# Patient Record
Sex: FEMALE | Race: WHITE | NOT HISPANIC OR LATINO | Employment: PART TIME | URBAN - METROPOLITAN AREA
[De-identification: names, ages, dates, MRNs, and addresses within clinical notes are randomized per-mention and may not be internally consistent; named-entity substitution may affect disease eponyms.]

---

## 2017-02-10 ENCOUNTER — CLINICAL SUPPORT (OUTPATIENT)
Dept: OBSTETRICS AND GYNECOLOGY | Facility: CLINIC | Age: 32
End: 2017-02-10

## 2017-02-10 VITALS
HEIGHT: 71 IN | BODY MASS INDEX: 36.4 KG/M2 | WEIGHT: 260 LBS | DIASTOLIC BLOOD PRESSURE: 72 MMHG | SYSTOLIC BLOOD PRESSURE: 110 MMHG | HEART RATE: 70 BPM

## 2017-02-10 DIAGNOSIS — Z30.42 ON DEPO-PROVERA FOR CONTRACEPTION: Primary | ICD-10-CM

## 2017-02-10 PROCEDURE — 96372 THER/PROPH/DIAG INJ SC/IM: CPT | Performed by: OBSTETRICS & GYNECOLOGY

## 2017-02-10 RX ORDER — MEDROXYPROGESTERONE ACETATE 150 MG/ML
150 INJECTION, SUSPENSION INTRAMUSCULAR ONCE
Status: COMPLETED | OUTPATIENT
Start: 2017-02-10 | End: 2017-02-10

## 2017-02-10 RX ORDER — MEDROXYPROGESTERONE ACETATE 150 MG/ML
150 INJECTION, SUSPENSION INTRAMUSCULAR
COMMUNITY
End: 2017-02-10 | Stop reason: SDUPTHER

## 2017-02-10 RX ADMIN — MEDROXYPROGESTERONE ACETATE 150 MG: 150 INJECTION, SUSPENSION INTRAMUSCULAR at 11:41

## 2017-02-10 NOTE — PROGRESS NOTES
Subjective   Lorena Vitale is a 31 y.o. female Depo injection. Lot #[id number], ndc #[id number], exp 2/19, R delt. Pt did not have a reaction to injection.    History of Present Illness        Review of Systems    Objective   Physical Exam      Assessment/Plan

## 2017-05-05 ENCOUNTER — CLINICAL SUPPORT (OUTPATIENT)
Dept: OBSTETRICS AND GYNECOLOGY | Facility: CLINIC | Age: 32
End: 2017-05-05

## 2017-05-05 VITALS
HEIGHT: 71 IN | DIASTOLIC BLOOD PRESSURE: 67 MMHG | BODY MASS INDEX: 37.63 KG/M2 | HEART RATE: 52 BPM | SYSTOLIC BLOOD PRESSURE: 105 MMHG | WEIGHT: 268.8 LBS

## 2017-05-05 DIAGNOSIS — Z30.013 ENCOUNTER FOR INITIAL PRESCRIPTION OF INJECTABLE CONTRACEPTIVE: Primary | ICD-10-CM

## 2017-05-05 PROCEDURE — 96372 THER/PROPH/DIAG INJ SC/IM: CPT | Performed by: OBSTETRICS & GYNECOLOGY

## 2017-05-05 RX ORDER — MEDROXYPROGESTERONE ACETATE 150 MG/ML
150 INJECTION, SUSPENSION INTRAMUSCULAR ONCE
Status: COMPLETED | OUTPATIENT
Start: 2017-05-05 | End: 2017-05-05

## 2017-05-05 RX ADMIN — MEDROXYPROGESTERONE ACETATE 150 MG: 150 INJECTION, SUSPENSION INTRAMUSCULAR at 11:25

## 2017-08-04 ENCOUNTER — CLINICAL SUPPORT (OUTPATIENT)
Dept: OBSTETRICS AND GYNECOLOGY | Facility: CLINIC | Age: 32
End: 2017-08-04

## 2017-08-04 VITALS
BODY MASS INDEX: 38.42 KG/M2 | HEART RATE: 61 BPM | SYSTOLIC BLOOD PRESSURE: 114 MMHG | DIASTOLIC BLOOD PRESSURE: 80 MMHG | WEIGHT: 274.4 LBS | HEIGHT: 71 IN

## 2017-08-04 DIAGNOSIS — Z30.013 ENCOUNTER FOR INITIAL PRESCRIPTION OF INJECTABLE CONTRACEPTIVE: Primary | ICD-10-CM

## 2017-08-04 PROCEDURE — 96372 THER/PROPH/DIAG INJ SC/IM: CPT | Performed by: OBSTETRICS & GYNECOLOGY

## 2017-08-04 RX ORDER — MEDROXYPROGESTERONE ACETATE 150 MG/ML
150 INJECTION, SUSPENSION INTRAMUSCULAR ONCE
Status: COMPLETED | OUTPATIENT
Start: 2017-08-04 | End: 2017-08-04

## 2017-08-04 RX ADMIN — MEDROXYPROGESTERONE ACETATE 150 MG: 150 INJECTION, SUSPENSION INTRAMUSCULAR at 11:19

## 2017-08-04 NOTE — PROGRESS NOTES
"Chief Complaint   Patient presents with   • Follow-up     depo       History of Present Illness    Patient is a 32 y.o. female    The following portions of the patient's history were reviewed and updated as appropriate: allergies, current medications, past family history, past medical history, past social history, past surgical history and problem list.    Review of Systems    Vitals:    08/04/17 1116   BP: 114/80   Pulse: 61   Weight: 274 lb 6.4 oz (124 kg)   Height: 71\" (180.3 cm)       Objective   Physical Exam      Assessment/Plan   Lorena was seen today for follow-up.    Diagnoses and all orders for this visit:    Encounter for initial prescription of injectable contraceptive  -     MedroxyPROGESTERone Acetate (DEPO-PROVERA) injection 150 mg; Inject 1 mL into the shoulder, thigh, or buttocks 1 (One) Time.                 No Follow-up on file.    "

## 2017-10-27 ENCOUNTER — CLINICAL SUPPORT (OUTPATIENT)
Dept: OBSTETRICS AND GYNECOLOGY | Facility: CLINIC | Age: 32
End: 2017-10-27

## 2017-10-27 VITALS — BODY MASS INDEX: 39.06 KG/M2 | HEIGHT: 71 IN | WEIGHT: 279 LBS

## 2017-10-27 DIAGNOSIS — Z30.42 DEPO-PROVERA CONTRACEPTIVE STATUS: Primary | ICD-10-CM

## 2017-10-27 PROCEDURE — 96372 THER/PROPH/DIAG INJ SC/IM: CPT | Performed by: OBSTETRICS & GYNECOLOGY

## 2017-10-27 RX ORDER — SULFAMETHOXAZOLE AND TRIMETHOPRIM 400; 80 MG/1; MG/1
1 TABLET ORAL 2 TIMES DAILY
COMMUNITY
End: 2018-01-19

## 2017-10-27 RX ORDER — MEDROXYPROGESTERONE ACETATE 150 MG/ML
150 INJECTION, SUSPENSION INTRAMUSCULAR ONCE
Status: COMPLETED | OUTPATIENT
Start: 2017-10-27 | End: 2017-10-27

## 2017-10-27 RX ADMIN — MEDROXYPROGESTERONE ACETATE 150 MG: 150 INJECTION, SUSPENSION INTRAMUSCULAR at 11:51

## 2017-10-27 NOTE — PROGRESS NOTES
Subjective   Lorena Vitale is a 32 y.o. female.   Here for depo provera inj. Lot # [id number] exp 04/2021 ndc [address]-5. Pt did not have a reaction.  History of Present Illness        Review of Systems    Objective   Physical Exam    Assessment/Plan   There are no diagnoses linked to this encounter.

## 2018-01-19 ENCOUNTER — CLINICAL SUPPORT (OUTPATIENT)
Dept: OBSTETRICS AND GYNECOLOGY | Facility: CLINIC | Age: 33
End: 2018-01-19

## 2018-01-19 VITALS
DIASTOLIC BLOOD PRESSURE: 78 MMHG | HEIGHT: 71 IN | WEIGHT: 274 LBS | BODY MASS INDEX: 38.36 KG/M2 | HEART RATE: 79 BPM | SYSTOLIC BLOOD PRESSURE: 115 MMHG

## 2018-01-19 DIAGNOSIS — Z30.42 DEPO-PROVERA CONTRACEPTIVE STATUS: Primary | ICD-10-CM

## 2018-01-19 PROCEDURE — 96372 THER/PROPH/DIAG INJ SC/IM: CPT | Performed by: OBSTETRICS & GYNECOLOGY

## 2018-01-19 RX ORDER — MEDROXYPROGESTERONE ACETATE 150 MG/ML
INJECTION, SUSPENSION INTRAMUSCULAR
Refills: 2 | COMMUNITY
Start: 2017-10-26 | End: 2018-04-13 | Stop reason: SDUPTHER

## 2018-01-19 RX ORDER — MEDROXYPROGESTERONE ACETATE 150 MG/ML
150 INJECTION, SUSPENSION INTRAMUSCULAR ONCE
Status: COMPLETED | OUTPATIENT
Start: 2018-01-19 | End: 2018-01-19

## 2018-01-19 RX ADMIN — MEDROXYPROGESTERONE ACETATE 150 MG: 150 INJECTION, SUSPENSION INTRAMUSCULAR at 11:06

## 2018-01-19 NOTE — PROGRESS NOTES
Subjective   Lorena Vitale is a 32 y.o. female. Here for depo provera inj. Lot # [id number] exp.5/2021 ndc [address]-3. Pt did not have a reaction.    History of Present Illness        Review of Systems    Objective   Physical Exam    Assessment/Plan   There are no diagnoses linked to this encounter.

## 2018-04-13 ENCOUNTER — OFFICE VISIT (OUTPATIENT)
Dept: OBSTETRICS AND GYNECOLOGY | Facility: CLINIC | Age: 33
End: 2018-04-13

## 2018-04-13 VITALS
SYSTOLIC BLOOD PRESSURE: 112 MMHG | DIASTOLIC BLOOD PRESSURE: 77 MMHG | WEIGHT: 282 LBS | HEIGHT: 71 IN | HEART RATE: 51 BPM | BODY MASS INDEX: 39.48 KG/M2

## 2018-04-13 DIAGNOSIS — Z79.899 ENCOUNTER FOR LONG-TERM (CURRENT) USE OF MEDICATIONS: ICD-10-CM

## 2018-04-13 DIAGNOSIS — Z72.0 TOBACCO USE: ICD-10-CM

## 2018-04-13 DIAGNOSIS — Z30.42 DEPO-PROVERA CONTRACEPTIVE STATUS: Primary | ICD-10-CM

## 2018-04-13 DIAGNOSIS — Z01.419 PAP SMEAR, AS PART OF ROUTINE GYNECOLOGICAL EXAMINATION: ICD-10-CM

## 2018-04-13 DIAGNOSIS — Z01.419 ENCOUNTER FOR GYNECOLOGICAL EXAMINATION WITHOUT ABNORMAL FINDING: ICD-10-CM

## 2018-04-13 DIAGNOSIS — N92.6 IRREGULAR BLEEDING: ICD-10-CM

## 2018-04-13 PROCEDURE — 99395 PREV VISIT EST AGE 18-39: CPT | Performed by: OBSTETRICS & GYNECOLOGY

## 2018-04-13 PROCEDURE — 99406 BEHAV CHNG SMOKING 3-10 MIN: CPT | Performed by: OBSTETRICS & GYNECOLOGY

## 2018-04-13 PROCEDURE — 96372 THER/PROPH/DIAG INJ SC/IM: CPT | Performed by: OBSTETRICS & GYNECOLOGY

## 2018-04-13 RX ORDER — MEDROXYPROGESTERONE ACETATE 150 MG/ML
150 INJECTION, SUSPENSION INTRAMUSCULAR ONCE
Status: COMPLETED | OUTPATIENT
Start: 2018-04-13 | End: 2018-04-13

## 2018-04-13 RX ORDER — MEDROXYPROGESTERONE ACETATE 150 MG/ML
1 INJECTION, SUSPENSION INTRAMUSCULAR
Qty: 1 ML | Refills: 3 | Status: SHIPPED | OUTPATIENT
Start: 2018-04-13 | End: 2019-03-08 | Stop reason: SDUPTHER

## 2018-04-13 RX ORDER — MELOXICAM 7.5 MG/1
TABLET ORAL
COMMUNITY
Start: 2018-03-24 | End: 2018-07-06

## 2018-04-13 RX ADMIN — MEDROXYPROGESTERONE ACETATE 150 MG: 150 INJECTION, SUSPENSION INTRAMUSCULAR at 11:32

## 2018-04-13 NOTE — PROGRESS NOTES
Subjective   Lorena Vitale is a 32 y.o. female  2, Para 2 AB 0, Living 2.  Last annual 18m, last pap >3y,   Cc: Annual exam  History of Present Illness  Patient denies any gynecologic problems at this time.  Patient is on Depo-Provera for menstrual regulation with good results.  She's never had a DEXA  The following portions of the patient's history were reviewed and updated as appropriate: allergies, current medications, past family history, past medical history, past social history, past surgical history and problem list.    Review of Systems   Constitutional: Negative for activity change, fatigue, fever and unexpected weight change.   HENT: Negative for hearing loss, sore throat and voice change.    Respiratory: Negative for cough, chest tightness, shortness of breath and wheezing.    Cardiovascular: Negative for chest pain, palpitations and leg swelling.   Gastrointestinal: Negative for abdominal distention, abdominal pain, anal bleeding, blood in stool, constipation, diarrhea, nausea, rectal pain and vomiting.   Endocrine: Negative for cold intolerance and heat intolerance.   Genitourinary: Negative for difficulty urinating, dyspareunia, dysuria, flank pain, frequency, genital sores, menstrual problem, pelvic pain, urgency, vaginal bleeding, vaginal discharge and vaginal pain.   Musculoskeletal: Negative for arthralgias and back pain.   Skin: Negative for rash.   Allergic/Immunologic: Negative for environmental allergies and food allergies.   Neurological: Negative for dizziness, tremors, syncope, weakness, light-headedness, numbness and headaches.   Hematological: Negative for adenopathy. Does not bruise/bleed easily.   Psychiatric/Behavioral: Negative for agitation, behavioral problems, self-injury, sleep disturbance and suicidal ideas. The patient is not nervous/anxious and is not hyperactive.          History reviewed. No pertinent past medical history.  Menstrual History:  OB History       "Para Term  AB Living    2 2 2   2    SAB TAB Ectopic Molar Multiple Live Births                  Menarche age: 13  No LMP recorded. Patient has had an injection.  Patient is on Depo-Provera for menstrual regulation, has irregular periods when not on contraception       Past Surgical History:   Procedure Laterality Date   • LAPAROSCOPIC TUBAL LIGATION       OB History      Para Term  AB Living    2 2 2   2    SAB TAB Ectopic Molar Multiple Live Births                 Family History   Problem Relation Age of Onset   • Diabetes Mother      History   Smoking Status   • Current Every Day Smoker   • Types: Cigarettes   Smokeless Tobacco   • Never Used     History   Alcohol Use No     Health Maintenance   Topic Date Due   • PNEUMOCOCCAL VACCINE (19-64 MEDIUM RISK) (1 of 1 - PPSV23) 2005   • TDAP/TD VACCINES (2 - Td) 12/10/2016   • PAP SMEAR  2018   • INFLUENZA VACCINE  2018       Current Outpatient Prescriptions:   •  MedroxyPROGESTERone Acetate 150 MG/ML suspension prefilled syringe, Inject 1 mL as directed Every 3 (Three) Months., Disp: 1 mL, Rfl: 3  •  meloxicam (MOBIC) 7.5 MG tablet, , Disp: , Rfl:   No current facility-administered medications for this visit.   Sexual History:Active  STD: HPV       I advised the patient of the risks in continuing to use tobacco, and I provided this patient with smoking cessation educational materials.  I also discussed how to quit smoking and the patient has expressed the willingness to quit.      During this visit, I spent > 3-10 minutes counseling the patient regarding smoking cessation.       Objective   Vitals:    18 1128   BP: 112/77   Pulse: 51   Weight: 128 kg (282 lb)   Height: 180.3 cm (71\")     Physical Exam   Constitutional: She is oriented to person, place, and time. She appears well-developed and well-nourished.   Obese   HENT:   Head: Normocephalic.   Eyes: Pupils are equal, round, and reactive to light.   Neck: Normal range " of motion. No thyromegaly present.   Cardiovascular: Normal rate, regular rhythm, normal heart sounds and intact distal pulses.    Pulmonary/Chest: Effort normal and breath sounds normal. No respiratory distress. She exhibits no tenderness. Right breast exhibits no inverted nipple, no mass, no nipple discharge, no skin change and no tenderness. Left breast exhibits no inverted nipple, no mass, no nipple discharge, no skin change and no tenderness. Breasts are symmetrical.   Abdominal: Soft. Bowel sounds are normal. Hernia confirmed negative in the right inguinal area and confirmed negative in the left inguinal area.   Genitourinary: Vagina normal and uterus normal. No breast tenderness or discharge. Pelvic exam was performed with patient supine. There is no rash, tenderness, lesion or injury on the right labia. There is no rash, tenderness, lesion or injury on the left labia. Uterus is not enlarged and not tender. Cervix exhibits no motion tenderness, no discharge and no friability. Right adnexum displays no mass, no tenderness and no fullness. Left adnexum displays no mass, no tenderness and no fullness.   Genitourinary Comments: Suboptimal exam secondary to body habitus   Lymphadenopathy:     She has no cervical adenopathy.        Right: No inguinal adenopathy present.        Left: No inguinal adenopathy present.   Neurological: She is alert and oriented to person, place, and time. She has normal reflexes.   Skin: Skin is warm and dry.   Psychiatric: She has a normal mood and affect. Her behavior is normal. Judgment and thought content normal.         Assessment/Plan   Lorena was seen today for gynecologic exam.    Diagnoses and all orders for this visit:    Depo-Provera contraceptive status  -     MedroxyPROGESTERone Acetate (DEPO-PROVERA) injection 150 mg; Inject 1 mL into the shoulder, thigh, or buttocks 1 (One) Time.    Pap smear, as part of routine gynecological examination  -     IGP, Apt HPV,rfx 16 /  18,45 - ThinPrep Vial, Cervix    Encounter for long-term (current) use of medications    Tobacco use    Irregular bleeding    Encounter for gynecological examination without abnormal finding    Other orders  -     MedroxyPROGESTERone Acetate 150 MG/ML suspension prefilled syringe; Inject 1 mL as directed Every 3 (Three) Months.  -      DEXA SCAN    Counseled about breast self-examination, DEXA scan, use of long-term Depo-Provera, smoking, weight loss

## 2018-04-13 NOTE — PATIENT INSTRUCTIONS
IF YOU SMOKE OR USE TOBACCO PLEASE READ THE FOLLOWING:    Why is smoking bad for me?  Smoking increases the risk of heart disease, lung disease, vascular disease, stroke, and cancer.     If you smoke, STOP!    If you would like more information on quitting smoking, please visit the [employer] website: [email or web address]   This link will provide additional resources including the QUIT line and the Beat the Pack support groups.     For more information:    Quit Now [facility]  1-800-QUIT-NOW  [email or web address]

## 2018-04-13 NOTE — PROGRESS NOTES
Subjective   Lorena Vitale is a 32 y.o. female. Here for depo provera inj. Lot # [id number] exp 5/31/2021 ndc [address]-7. Pt did not have a reaction.    History of Present Illness        Review of Systems    Objective   Physical Exam    Assessment/Plan   There are no diagnoses linked to this encounter.

## 2018-04-18 LAB
CYTOLOGIST CVX/VAG CYTO: NORMAL
CYTOLOGY CVX/VAG DOC THIN PREP: NORMAL
DX ICD CODE: NORMAL
DX ICD CODE: NORMAL
HIV 1 & 2 AB SER-IMP: NORMAL
HPV I/H RISK 4 DNA CVX QL PROBE+SIG AMP: NEGATIVE
Lab: NORMAL
OTHER STN SPEC: NORMAL
PATH REPORT.FINAL DX SPEC: NORMAL
STAT OF ADQ CVX/VAG CYTO-IMP: NORMAL

## 2018-07-06 ENCOUNTER — CLINICAL SUPPORT (OUTPATIENT)
Dept: OBSTETRICS AND GYNECOLOGY | Facility: CLINIC | Age: 33
End: 2018-07-06

## 2018-07-06 VITALS
HEIGHT: 71 IN | SYSTOLIC BLOOD PRESSURE: 109 MMHG | WEIGHT: 278 LBS | BODY MASS INDEX: 38.92 KG/M2 | HEART RATE: 71 BPM | DIASTOLIC BLOOD PRESSURE: 75 MMHG

## 2018-07-06 DIAGNOSIS — Z30.42 DEPO-PROVERA CONTRACEPTIVE STATUS: Primary | ICD-10-CM

## 2018-07-06 PROCEDURE — 96372 THER/PROPH/DIAG INJ SC/IM: CPT | Performed by: OBSTETRICS & GYNECOLOGY

## 2018-07-06 RX ORDER — MEDROXYPROGESTERONE ACETATE 150 MG/ML
150 INJECTION, SUSPENSION INTRAMUSCULAR ONCE
Status: COMPLETED | OUTPATIENT
Start: 2018-07-06 | End: 2018-07-06

## 2018-07-06 RX ADMIN — MEDROXYPROGESTERONE ACETATE 150 MG: 150 INJECTION, SUSPENSION INTRAMUSCULAR at 11:07

## 2018-07-06 NOTE — PROGRESS NOTES
Subjective   Lorena Vitale is a 33 y.o. female. Pt here for depo provera inj. Lot # [id number] exp 5/31/21 ndc [address]-8. Pt did not have a reaction.    History of Present Illness        Review of Systems    Objective   Physical Exam    Assessment/Plan   There are no diagnoses linked to this encounter.

## 2018-09-21 ENCOUNTER — CLINICAL SUPPORT (OUTPATIENT)
Dept: OBSTETRICS AND GYNECOLOGY | Facility: CLINIC | Age: 33
End: 2018-09-21

## 2018-09-21 VITALS
SYSTOLIC BLOOD PRESSURE: 123 MMHG | HEART RATE: 84 BPM | HEIGHT: 71 IN | DIASTOLIC BLOOD PRESSURE: 75 MMHG | WEIGHT: 274 LBS | BODY MASS INDEX: 38.36 KG/M2

## 2018-09-21 DIAGNOSIS — Z30.42 DEPO-PROVERA CONTRACEPTIVE STATUS: Primary | ICD-10-CM

## 2018-09-21 PROCEDURE — 96372 THER/PROPH/DIAG INJ SC/IM: CPT | Performed by: OBSTETRICS & GYNECOLOGY

## 2018-09-21 RX ORDER — IBUPROFEN 600 MG/1
600 TABLET ORAL
COMMUNITY
End: 2018-12-14

## 2018-09-21 RX ORDER — MEDROXYPROGESTERONE ACETATE 150 MG/ML
150 INJECTION, SUSPENSION INTRAMUSCULAR ONCE
Status: COMPLETED | OUTPATIENT
Start: 2018-09-21 | End: 2018-09-21

## 2018-09-21 RX ADMIN — MEDROXYPROGESTERONE ACETATE 150 MG: 150 INJECTION, SUSPENSION INTRAMUSCULAR at 09:25

## 2018-09-21 NOTE — PROGRESS NOTES
Subjective   Lorena Vitale is a 33 y.o. female. Here for Depo provera inj. Lot # [id number] exp 5/31/2022 ndc [address]-6. Pt did not have a reaction.    History of Present Illness        Review of Systems    Objective   Physical Exam    Assessment/Plan   There are no diagnoses linked to this encounter.

## 2018-12-14 ENCOUNTER — CLINICAL SUPPORT (OUTPATIENT)
Dept: OBSTETRICS AND GYNECOLOGY | Facility: CLINIC | Age: 33
End: 2018-12-14

## 2018-12-14 VITALS
SYSTOLIC BLOOD PRESSURE: 110 MMHG | BODY MASS INDEX: 38.08 KG/M2 | HEART RATE: 52 BPM | DIASTOLIC BLOOD PRESSURE: 71 MMHG | WEIGHT: 272 LBS | HEIGHT: 71 IN

## 2018-12-14 DIAGNOSIS — Z30.42 DEPO-PROVERA CONTRACEPTIVE STATUS: Primary | ICD-10-CM

## 2018-12-14 PROCEDURE — 96372 THER/PROPH/DIAG INJ SC/IM: CPT | Performed by: OBSTETRICS & GYNECOLOGY

## 2018-12-14 RX ORDER — MEDROXYPROGESTERONE ACETATE 150 MG/ML
150 INJECTION, SUSPENSION INTRAMUSCULAR ONCE
Status: COMPLETED | OUTPATIENT
Start: 2018-12-14 | End: 2018-12-14

## 2018-12-14 RX ADMIN — MEDROXYPROGESTERONE ACETATE 150 MG: 150 INJECTION, SUSPENSION INTRAMUSCULAR at 10:57

## 2018-12-14 NOTE — PROGRESS NOTES
Subjective   Lorena Vitale is a 33 y.o. female. Pt is here for depo provera inj. Lot # [id number] exp 9/30/2022 ndc [address]-8. Pt did not have a reaction.    History of Present Illness        Review of Systems    Objective   Physical Exam    Assessment/Plan   There are no diagnoses linked to this encounter.

## 2019-03-08 ENCOUNTER — CLINICAL SUPPORT (OUTPATIENT)
Dept: OBSTETRICS AND GYNECOLOGY | Facility: CLINIC | Age: 34
End: 2019-03-08

## 2019-03-08 VITALS
DIASTOLIC BLOOD PRESSURE: 79 MMHG | HEART RATE: 72 BPM | HEIGHT: 71 IN | WEIGHT: 279 LBS | SYSTOLIC BLOOD PRESSURE: 111 MMHG | BODY MASS INDEX: 39.06 KG/M2

## 2019-03-08 DIAGNOSIS — Z30.013 ENCOUNTER FOR INITIAL PRESCRIPTION OF INJECTABLE CONTRACEPTIVE: Primary | ICD-10-CM

## 2019-03-08 RX ORDER — MEDROXYPROGESTERONE ACETATE 150 MG/ML
150 INJECTION, SUSPENSION INTRAMUSCULAR ONCE
Status: COMPLETED | OUTPATIENT
Start: 2019-03-08 | End: 2019-03-08

## 2019-03-08 RX ADMIN — MEDROXYPROGESTERONE ACETATE 150 MG: 150 INJECTION, SUSPENSION INTRAMUSCULAR at 11:00

## 2019-05-16 ENCOUNTER — TELEPHONE (OUTPATIENT)
Dept: OBSTETRICS AND GYNECOLOGY | Facility: CLINIC | Age: 34
End: 2019-05-16

## 2019-05-16 NOTE — TELEPHONE ENCOUNTER
Pt is scheduled with you on 5/28 for her annual/depo shot. She is asking if you can please send in a refill for her MedroxyPROGESTERone Acetate (DEPO-PROVERA) injection 150 mg. RX # in chart, call back # [phone]

## 2019-05-28 ENCOUNTER — OFFICE VISIT (OUTPATIENT)
Dept: OBSTETRICS AND GYNECOLOGY | Facility: CLINIC | Age: 34
End: 2019-05-28

## 2019-05-28 VITALS
WEIGHT: 280 LBS | DIASTOLIC BLOOD PRESSURE: 76 MMHG | HEART RATE: 49 BPM | SYSTOLIC BLOOD PRESSURE: 110 MMHG | BODY MASS INDEX: 39.2 KG/M2 | HEIGHT: 71 IN

## 2019-05-28 DIAGNOSIS — Z01.419 WELL WOMAN EXAM WITH ROUTINE GYNECOLOGICAL EXAM: Primary | ICD-10-CM

## 2019-05-28 PROCEDURE — 99395 PREV VISIT EST AGE 18-39: CPT | Performed by: OBSTETRICS & GYNECOLOGY

## 2019-05-28 PROCEDURE — 99406 BEHAV CHNG SMOKING 3-10 MIN: CPT | Performed by: OBSTETRICS & GYNECOLOGY

## 2019-05-28 PROCEDURE — 96372 THER/PROPH/DIAG INJ SC/IM: CPT | Performed by: OBSTETRICS & GYNECOLOGY

## 2019-05-28 RX ORDER — MEDROXYPROGESTERONE ACETATE 150 MG/ML
1 INJECTION, SUSPENSION INTRAMUSCULAR
Qty: 1 EACH | Refills: 4 | Status: SHIPPED | OUTPATIENT
Start: 2019-05-28 | End: 2020-07-23 | Stop reason: SDUPTHER

## 2019-05-28 RX ORDER — MEDROXYPROGESTERONE ACETATE 150 MG/ML
INJECTION, SUSPENSION INTRAMUSCULAR
Refills: 0 | COMMUNITY
Start: 2019-05-22 | End: 2019-05-28 | Stop reason: SDUPTHER

## 2019-05-28 RX ORDER — MEDROXYPROGESTERONE ACETATE 150 MG/ML
150 INJECTION, SUSPENSION INTRAMUSCULAR ONCE
Status: COMPLETED | OUTPATIENT
Start: 2019-05-28 | End: 2019-05-28

## 2019-05-28 RX ADMIN — MEDROXYPROGESTERONE ACETATE 150 MG: 150 INJECTION, SUSPENSION INTRAMUSCULAR at 16:53

## 2019-05-28 NOTE — PATIENT INSTRUCTIONS
TIMING OF HEALTH BENEFITS AFTER QUITTING SMOKING ([facility] 2004)   Time since quitting Benefits   20 minutes   Your heart rate drops.   12 hours   Carbon monoxide level in your blood drops to normal.   2 weeks to 3 months Your heart attack risk begins to drop.      Your lung function begins to improve.   1 to 9 months   Your coughing and shortness of breath decrease.   1 year    Your added risk of coronary heart disease is half that of a smoker’s.   5 to 15 years   Your stroke risk is reduced to that of a nonsmoker’s.   10 years   Your lung cancer death rate is about half that of a smoker’s. Your risk of cancers of the mouth, throat, esophagus, bladder, kidney,    and pancreas decreases.   15 years   Your risk of coronary heart disease is back to that of a nonsmoker’s.     Adapted from ACOG (www.acog.org)

## 2019-05-28 NOTE — PROGRESS NOTES
(Patient supplied)  Patient here for depo injection. Patient did not have a reaction to the injection or medication. Injection on the right deltoid.  LOT: [id number]  EXP: 2023 jul 31

## 2019-05-28 NOTE — PROGRESS NOTES
Chief Complaint   Patient presents with   • Annual Exam     last pap: 4.13.18 NILM --HPV        Lorena Vtiale is a 34 y.o.  who presents for an annual examination     Pap history:  Last pap: 2018: NILM, HPV negative  Prior abnormal paps: yes, 7 years ago - treated with colposcopy  STDs  Sexually active: yes  History of STDs: no  Contraception:  Depo, no menses    Screening for BRCA-   Is patient's family history significant for BRCA risk factors? no    Past Medical History:   Diagnosis Date   • Abnormal Pap smear of cervix     HPV   • HPV (human papilloma virus) infection      Past Surgical History:   Procedure Laterality Date   • LAPAROSCOPIC TUBAL LIGATION       OB History    Para Term  AB Living   2 2 2     2   SAB TAB Ectopic Molar Multiple Live Births                    # Outcome Date GA Lbr Jerad/2nd Weight Sex Delivery Anes PTL Lv   2 Term      Vag-Spont      1 Term      Vag-Spont            Social History     Tobacco Use   • Smoking status: Current Every Day Smoker     Packs/day: 1.50     Types: Cigarettes   • Smokeless tobacco: Never Used   Substance Use Topics   • Alcohol use: No   • Drug use: No     Family History   Problem Relation Age of Onset   • Diabetes Mother    • Breast cancer Neg Hx    • Ovarian cancer Neg Hx    • Uterine cancer Neg Hx    • Colon cancer Neg Hx    • Deep vein thrombosis Neg Hx    • Pulmonary embolism Neg Hx      Current Outpatient Medications on File Prior to Visit   Medication Sig Dispense Refill   • [DISCONTINUED] medroxyPROGESTERone Acetate 150 MG/ML suspension prefilled syringe INJECT 1 ML IM Q  3 MONTHS  0     No current facility-administered medications on file prior to visit.      No Known Allergies     Review of Systems   Constitutional: Negative for chills, fever and unexpected weight change.   HENT: Negative for congestion, nosebleeds and sore throat.    Eyes: Negative for visual disturbance.   Respiratory: Negative for cough, chest tightness and  "shortness of breath.    Cardiovascular: Negative for chest pain and palpitations.   Gastrointestinal: Negative for abdominal pain, constipation, diarrhea, nausea and vomiting.   Endocrine: Negative for polyuria.   Genitourinary: Negative for difficulty urinating, dyspareunia, dysuria, frequency, genital sores, hematuria, menstrual problem, pelvic pain, urgency, vaginal bleeding, vaginal discharge and vaginal pain.   Musculoskeletal: Negative for arthralgias and joint swelling.   Skin: Negative for color change and rash.   Neurological: Negative for dizziness, light-headedness and headaches.   Hematological: Does not bruise/bleed easily.   Psychiatric/Behavioral: Negative for dysphoric mood. The patient is not nervous/anxious.          OBJECTIVE:   Vitals:    05/28/19 1007   BP: 110/76   Pulse: (!) 49   Weight: 127 kg (280 lb)   Height: 180.3 cm (70.98\")      Physical Exam   Constitutional: She is oriented to person, place, and time. She appears well-developed and well-nourished. No distress.   HENT:   Head: Normocephalic and atraumatic.   Eyes: EOM are normal. No scleral icterus.   Neck: Normal range of motion. Neck supple. No thyromegaly present.   Cardiovascular: Normal rate and regular rhythm. Exam reveals no gallop and no friction rub.   No murmur heard.  Pulmonary/Chest: Effort normal and breath sounds normal. No respiratory distress. She has no wheezes. She has no rales. She exhibits no tenderness. Right breast exhibits no inverted nipple. Left breast exhibits no inverted nipple. Breasts are symmetrical. There is no breast swelling.   Abdominal: Soft. Bowel sounds are normal. She exhibits no distension. There is no tenderness. There is no guarding.   Genitourinary: Vagina normal and uterus normal. There is no rash, tenderness, lesion, injury or Bartholin's cyst on the right labia. There is no rash, tenderness, lesion, injury or Bartholin's cyst on the left labia. Uterus is not mobile.   Cervix is not parous. " Cervix does not exhibit motion tenderness, discharge, friability, lesion, polyp, nabothian cyst, eversion, pinkness or cyanosis. Right adnexum displays no mass, no tenderness and no fullness. Right adnexum is present.Left adnexum displays no mass, no tenderness and no fullness. Left adnexum is present.No vaginal discharge found.   Musculoskeletal: Normal range of motion. She exhibits no edema or deformity.   Neurological: She is alert and oriented to person, place, and time.   Skin: Skin is warm and dry. No rash noted. She is not diaphoretic. No erythema.   Psychiatric: She has a normal mood and affect. Her speech is normal and behavior is normal. Judgment and thought content normal. Cognition and memory are normal.       ASSESSMENT/PLAN:     Well woman counseling/screening:    Cervical cancer screening:    Remote (7y ago) h/o cervical dysplasia in past   The patient is due for a pap in 2023.    Screening guidelines discussed with patient  Breast cancer screening:    Clinical breast exam recommended for age 20-39 years every 1-3 years   Mammogram recommended starting age 40   Breast self awareness encouraged  STD Screening   Testing declined.    Contraception :   Desires to continue DMPA.  Counseled on risks/benefits/alternatives    Family history    does not demonstrate need for genetics referral   Healthy lifestyle counseling:   return for routine annual checkups    Smoking cessation  I advised patient to quit, and offered support.  She is considering Wellbutrin/Chantix with PCP  I spent 5 minutes counseling the patient on benefits of smoking cessation and risks of continuing the behavior. Further information placed in AVS      Return in about 1 year (around 5/28/2020) for Annual physical.

## 2019-08-20 ENCOUNTER — CLINICAL SUPPORT (OUTPATIENT)
Dept: OBSTETRICS AND GYNECOLOGY | Facility: CLINIC | Age: 34
End: 2019-08-20

## 2019-08-20 VITALS
BODY MASS INDEX: 39.06 KG/M2 | DIASTOLIC BLOOD PRESSURE: 75 MMHG | HEART RATE: 56 BPM | WEIGHT: 279 LBS | HEIGHT: 71 IN | SYSTOLIC BLOOD PRESSURE: 108 MMHG

## 2019-08-20 DIAGNOSIS — Z30.42 ENCOUNTER FOR MANAGEMENT AND INJECTION OF DEPO-PROVERA: Primary | ICD-10-CM

## 2019-08-20 PROCEDURE — 96372 THER/PROPH/DIAG INJ SC/IM: CPT | Performed by: OBSTETRICS & GYNECOLOGY

## 2019-08-20 RX ORDER — MEDROXYPROGESTERONE ACETATE 150 MG/ML
150 INJECTION, SUSPENSION INTRAMUSCULAR ONCE
Status: COMPLETED | OUTPATIENT
Start: 2019-08-20 | End: 2019-08-20

## 2019-08-20 RX ADMIN — MEDROXYPROGESTERONE ACETATE 150 MG: 150 INJECTION, SUSPENSION INTRAMUSCULAR at 11:57

## 2019-08-23 NOTE — PROGRESS NOTES
(Patient supplied)  Patient here for depo injection. Patient did not have a reaction to the injection or medication. Injection on the right deltoid.

## 2019-11-12 ENCOUNTER — CLINICAL SUPPORT (OUTPATIENT)
Dept: OBSTETRICS AND GYNECOLOGY | Facility: CLINIC | Age: 34
End: 2019-11-12

## 2019-11-12 VITALS
DIASTOLIC BLOOD PRESSURE: 93 MMHG | HEIGHT: 71 IN | BODY MASS INDEX: 38.5 KG/M2 | SYSTOLIC BLOOD PRESSURE: 144 MMHG | WEIGHT: 275 LBS | HEART RATE: 90 BPM

## 2019-11-12 DIAGNOSIS — Z30.42 ENCOUNTER FOR MANAGEMENT AND INJECTION OF DEPO-PROVERA: Primary | ICD-10-CM

## 2019-11-12 PROCEDURE — 96372 THER/PROPH/DIAG INJ SC/IM: CPT | Performed by: OBSTETRICS & GYNECOLOGY

## 2019-11-12 RX ORDER — MEDROXYPROGESTERONE ACETATE 150 MG/ML
150 INJECTION, SUSPENSION INTRAMUSCULAR ONCE
Status: COMPLETED | OUTPATIENT
Start: 2019-11-12 | End: 2019-11-12

## 2019-11-12 RX ADMIN — MEDROXYPROGESTERONE ACETATE 150 MG: 150 INJECTION, SUSPENSION INTRAMUSCULAR at 10:32

## 2020-02-05 ENCOUNTER — CLINICAL SUPPORT (OUTPATIENT)
Dept: OBSTETRICS AND GYNECOLOGY | Facility: CLINIC | Age: 35
End: 2020-02-05

## 2020-02-05 VITALS
HEIGHT: 71 IN | SYSTOLIC BLOOD PRESSURE: 147 MMHG | DIASTOLIC BLOOD PRESSURE: 99 MMHG | HEART RATE: 55 BPM | WEIGHT: 280 LBS | BODY MASS INDEX: 39.2 KG/M2

## 2020-02-05 DIAGNOSIS — Z30.42 ENCOUNTER FOR MANAGEMENT AND INJECTION OF DEPO-PROVERA: Primary | ICD-10-CM

## 2020-02-05 PROCEDURE — 96372 THER/PROPH/DIAG INJ SC/IM: CPT | Performed by: OBSTETRICS & GYNECOLOGY

## 2020-02-05 RX ORDER — MEDROXYPROGESTERONE ACETATE 150 MG/ML
150 INJECTION, SUSPENSION INTRAMUSCULAR ONCE
Status: COMPLETED | OUTPATIENT
Start: 2020-02-05 | End: 2020-02-05

## 2020-02-05 RX ADMIN — MEDROXYPROGESTERONE ACETATE 150 MG: 150 INJECTION, SUSPENSION INTRAMUSCULAR at 10:53

## 2020-02-05 NOTE — PROGRESS NOTES
(Patient supplied)  Patient here for depo injection. Patient did not have a reaction to the injection or medication. Injection on the right deltoid. Lot [id number] exp 2024 ashwini 31

## 2020-05-06 ENCOUNTER — CLINICAL SUPPORT (OUTPATIENT)
Dept: OBSTETRICS AND GYNECOLOGY | Facility: CLINIC | Age: 35
End: 2020-05-06

## 2020-05-06 VITALS
SYSTOLIC BLOOD PRESSURE: 110 MMHG | WEIGHT: 288 LBS | BODY MASS INDEX: 40.32 KG/M2 | HEIGHT: 71 IN | HEART RATE: 54 BPM | DIASTOLIC BLOOD PRESSURE: 72 MMHG

## 2020-05-06 DIAGNOSIS — Z30.42 ENCOUNTER FOR MANAGEMENT AND INJECTION OF DEPO-PROVERA: Primary | ICD-10-CM

## 2020-05-06 PROCEDURE — 96372 THER/PROPH/DIAG INJ SC/IM: CPT | Performed by: OBSTETRICS & GYNECOLOGY

## 2020-05-06 RX ORDER — CEPHALEXIN 500 MG/1
500 CAPSULE ORAL
COMMUNITY
Start: 2020-04-29 | End: 2020-05-06

## 2020-05-06 RX ORDER — MEDROXYPROGESTERONE ACETATE 150 MG/ML
150 INJECTION, SUSPENSION INTRAMUSCULAR ONCE
Status: COMPLETED | OUTPATIENT
Start: 2020-05-06 | End: 2020-05-06

## 2020-05-06 RX ADMIN — MEDROXYPROGESTERONE ACETATE 150 MG: 150 INJECTION, SUSPENSION INTRAMUSCULAR at 12:14

## 2020-05-06 NOTE — PROGRESS NOTES
(Patient supplied)  Patient here for depo injection. Patient did not have a reaction to the injection or medication. Injection on the right deltoid. [id number] 2024 may 31

## 2020-07-23 RX ORDER — MEDROXYPROGESTERONE ACETATE 150 MG/ML
1 INJECTION, SUSPENSION INTRAMUSCULAR
Qty: 1 ML | Refills: 0 | Status: SHIPPED | OUTPATIENT
Start: 2020-07-23 | End: 2020-10-20 | Stop reason: SDUPTHER

## 2020-07-31 ENCOUNTER — CLINICAL SUPPORT (OUTPATIENT)
Dept: OBSTETRICS AND GYNECOLOGY | Facility: CLINIC | Age: 35
End: 2020-07-31

## 2020-07-31 DIAGNOSIS — Z30.42 ENCOUNTER FOR MANAGEMENT AND INJECTION OF DEPO-PROVERA: Primary | ICD-10-CM

## 2020-07-31 PROCEDURE — 96372 THER/PROPH/DIAG INJ SC/IM: CPT | Performed by: OBSTETRICS & GYNECOLOGY

## 2020-07-31 RX ORDER — MEDROXYPROGESTERONE ACETATE 150 MG/ML
150 INJECTION, SUSPENSION INTRAMUSCULAR ONCE
Status: COMPLETED | OUTPATIENT
Start: 2020-07-31 | End: 2020-07-31

## 2020-07-31 RX ADMIN — MEDROXYPROGESTERONE ACETATE 150 MG: 150 INJECTION, SUSPENSION INTRAMUSCULAR at 11:24

## 2020-07-31 NOTE — PROGRESS NOTES
Depo Provera 150 mg  Pt supplied  Tolerated well, no reaction  IM Right Deltoid  NDC: [address]-5  LOT: [id number]  EXP: 10/31/2023

## 2020-10-20 ENCOUNTER — OFFICE VISIT (OUTPATIENT)
Dept: OBSTETRICS AND GYNECOLOGY | Facility: CLINIC | Age: 35
End: 2020-10-20

## 2020-10-20 VITALS
HEIGHT: 71 IN | BODY MASS INDEX: 40.6 KG/M2 | HEART RATE: 90 BPM | SYSTOLIC BLOOD PRESSURE: 144 MMHG | WEIGHT: 290 LBS | DIASTOLIC BLOOD PRESSURE: 89 MMHG

## 2020-10-20 DIAGNOSIS — Z01.419 WELL WOMAN EXAM WITH ROUTINE GYNECOLOGICAL EXAM: Primary | ICD-10-CM

## 2020-10-20 DIAGNOSIS — Z30.42 ENCOUNTER FOR MANAGEMENT AND INJECTION OF DEPO-PROVERA: ICD-10-CM

## 2020-10-20 PROCEDURE — 96372 THER/PROPH/DIAG INJ SC/IM: CPT | Performed by: OBSTETRICS & GYNECOLOGY

## 2020-10-20 PROCEDURE — 99395 PREV VISIT EST AGE 18-39: CPT | Performed by: OBSTETRICS & GYNECOLOGY

## 2020-10-20 PROCEDURE — 99407 BEHAV CHNG SMOKING > 10 MIN: CPT | Performed by: OBSTETRICS & GYNECOLOGY

## 2020-10-20 RX ORDER — MEDROXYPROGESTERONE ACETATE 150 MG/ML
150 INJECTION, SUSPENSION INTRAMUSCULAR
Qty: 1 ML | Refills: 4 | Status: SHIPPED | OUTPATIENT
Start: 2020-10-20 | End: 2021-09-24 | Stop reason: SDUPTHER

## 2020-10-20 RX ORDER — BUPROPION HYDROCHLORIDE 150 MG/1
TABLET, EXTENDED RELEASE ORAL
Qty: 60 TABLET | Refills: 2 | Status: SHIPPED | OUTPATIENT
Start: 2020-10-20 | End: 2022-03-15

## 2020-10-20 RX ORDER — MEDROXYPROGESTERONE ACETATE 150 MG/ML
1 INJECTION, SUSPENSION INTRAMUSCULAR
Qty: 1 ML | Refills: 0 | Status: SHIPPED | OUTPATIENT
Start: 2020-10-20 | End: 2021-09-24 | Stop reason: SDUPTHER

## 2020-10-20 RX ORDER — MEDROXYPROGESTERONE ACETATE 150 MG/ML
150 INJECTION, SUSPENSION INTRAMUSCULAR ONCE
Status: COMPLETED | OUTPATIENT
Start: 2020-10-20 | End: 2020-10-20

## 2020-10-20 RX ADMIN — MEDROXYPROGESTERONE ACETATE 150 MG: 150 INJECTION, SUSPENSION INTRAMUSCULAR at 12:45

## 2020-10-20 NOTE — PROGRESS NOTES
Chief Complaint   Patient presents with   • Annual Exam     pap:  NILM -HPV        Lorena Vitale is a 35 y.o.  who presents for an annual examination     Pap history:  Last pap: 2018: NILM, HPV negative  Prior abnormal paps: yes, 7 years ago - treated with colposcopy  STDs  Sexually active: yes  History of STDs: no  Contraception:  Depo, no menses    Screening for BRCA-   Is patient's family history significant for BRCA risk factors? no    Past Medical History:   Diagnosis Date   • Abnormal Pap smear of cervix     HPV   • HPV (human papilloma virus) infection      Past Surgical History:   Procedure Laterality Date   • LAPAROSCOPIC TUBAL LIGATION     • TUBAL ABDOMINAL LIGATION       OB History    Para Term  AB Living   2 2 2     2   SAB TAB Ectopic Molar Multiple Live Births                    # Outcome Date GA Lbr Jerad/2nd Weight Sex Delivery Anes PTL Lv   2 Term      Vag-Spont      1 Term      Vag-Spont         Social History     Tobacco Use   • Smoking status: Current Every Day Smoker     Packs/day: 1.50     Types: Cigarettes   • Smokeless tobacco: Never Used   Substance Use Topics   • Alcohol use: No   • Drug use: No     Family History   Problem Relation Age of Onset   • Diabetes Mother    • Breast cancer Neg Hx    • Ovarian cancer Neg Hx    • Uterine cancer Neg Hx    • Colon cancer Neg Hx    • Deep vein thrombosis Neg Hx    • Pulmonary embolism Neg Hx      Current Outpatient Medications on File Prior to Visit   Medication Sig Dispense Refill   • Cetirizine HCl (WAL-ZYR PO)      • [DISCONTINUED] medroxyPROGESTERone Acetate 150 MG/ML suspension prefilled syringe Inject 1 mL into the appropriate muscle as directed by prescriber Every 3 (Three) Months. 1 mL 0     No current facility-administered medications on file prior to visit.      No Known Allergies     Review of Systems   Constitutional: Negative for chills, fever and unexpected weight change.   HENT: Negative for congestion,  "nosebleeds and sore throat.    Eyes: Negative for visual disturbance.   Respiratory: Negative for cough, chest tightness and shortness of breath.    Cardiovascular: Negative for chest pain and palpitations.   Gastrointestinal: Negative for abdominal pain, constipation, diarrhea, nausea and vomiting.   Endocrine: Negative for polyuria.   Genitourinary: Negative for difficulty urinating, dyspareunia, dysuria, frequency, genital sores, hematuria, menstrual problem, pelvic pain, urgency, vaginal bleeding, vaginal discharge and vaginal pain.   Musculoskeletal: Negative for arthralgias and joint swelling.   Skin: Negative for color change and rash.   Neurological: Negative for dizziness, light-headedness and headaches.   Hematological: Does not bruise/bleed easily.   Psychiatric/Behavioral: Negative for dysphoric mood. The patient is not nervous/anxious.          OBJECTIVE:   Vitals:    10/20/20 1052   BP: 144/89   Pulse: 90   Weight: 132 kg (290 lb)   Height: 180.3 cm (70.98\")      Physical Exam   Constitutional: She is oriented to person, place, and time. She appears well-developed and well-nourished. No distress.   HENT:   Head: Normocephalic and atraumatic.   Eyes: No scleral icterus.   Neck: Normal range of motion. Neck supple. No thyromegaly present.   Cardiovascular: Normal rate and regular rhythm. Exam reveals no gallop and no friction rub.   No murmur heard.  Pulmonary/Chest: Effort normal and breath sounds normal. No respiratory distress. She has no wheezes. She has no rales. She exhibits no tenderness. Right breast exhibits no inverted nipple. Left breast exhibits no inverted nipple. No breast swelling, tenderness, discharge or bleeding. Breasts are symmetrical.   Abdominal: Soft. Bowel sounds are normal. She exhibits no distension. There is no abdominal tenderness. There is no guarding.   Genitourinary: Vagina normal and uterus normal. There is no rash, tenderness, lesion, injury or Bartholin's cyst on the " right labia. There is no rash, tenderness, lesion, injury or Bartholin's cyst on the left labia. Uterus is not mobile.   Cervix is not parous. Cervix does not exhibit motion tenderness, discharge, friability, lesion, polyp, nabothian cyst, eversion, pinkness or cyanosis. Right adnexum displays no mass, no tenderness and no fullness. Right adnexum is present.Left adnexum displays no mass, no tenderness and no fullness. Left adnexum is present.No vaginal discharge found.   Musculoskeletal: Normal range of motion. No deformity.   Neurological: She is alert and oriented to person, place, and time.   Skin: Skin is warm and dry. No rash noted. She is not diaphoretic. No erythema.   Psychiatric: Her speech is normal and behavior is normal. Judgment and thought content normal.       ASSESSMENT/PLAN:     Well woman counseling/screening:    Cervical cancer screening:    Remote (7y ago) h/o cervical dysplasia in past   The patient is due for a pap in 2023.    Screening guidelines discussed with patient  Breast cancer screening:    Clinical breast exam recommended for age 20-39 years every 1-3 years   Mammogram recommended starting age 40   Breast self awareness encouraged  STD Screening   Testing declined.    Contraception :   Desires to continue DMPA.  Counseled on risks/benefits/alternatives. Patient counseled on other options.   Family history    does not demonstrate need for genetics referral   Healthy lifestyle counseling:   return for routine annual checkups   Elevated BP - reports she smoked before she came in and she feels that may have contributed to increased BP.  She will follow up with PCP.     Smoking cessation  Patient reports current nicotine user   She was counseled on the benefits of smoking cessation including but not limited to cardiovascular benefits.  She is willing to quit.   Quit date: one week from start of medication  Desires to start Wellbutrin as medication for cessation  Resources and counseling  provided  Follow up 2-3 weeks  I spent 5 minutes counseling the patient on benefits of smoking cessation and risks of continuing the behavior.           Return in about 3 weeks (around 11/10/2020) for recheck of wellbutrin, 3 months for DMPA .

## 2020-11-17 ENCOUNTER — TELEPHONE (OUTPATIENT)
Dept: OBSTETRICS AND GYNECOLOGY | Facility: CLINIC | Age: 35
End: 2020-11-17

## 2021-01-22 ENCOUNTER — CLINICAL SUPPORT (OUTPATIENT)
Dept: OBSTETRICS AND GYNECOLOGY | Facility: CLINIC | Age: 36
End: 2021-01-22

## 2021-01-22 DIAGNOSIS — Z30.42 ENCOUNTER FOR MANAGEMENT AND INJECTION OF DEPO-PROVERA: Primary | ICD-10-CM

## 2021-01-22 PROCEDURE — 96372 THER/PROPH/DIAG INJ SC/IM: CPT | Performed by: OBSTETRICS & GYNECOLOGY

## 2021-01-22 RX ORDER — MEDROXYPROGESTERONE ACETATE 150 MG/ML
150 INJECTION, SUSPENSION INTRAMUSCULAR ONCE
Status: COMPLETED | OUTPATIENT
Start: 2021-01-22 | End: 2021-01-22

## 2021-01-22 RX ADMIN — MEDROXYPROGESTERONE ACETATE 150 MG: 150 INJECTION, SUSPENSION INTRAMUSCULAR at 10:41

## 2021-04-16 ENCOUNTER — CLINICAL SUPPORT (OUTPATIENT)
Dept: OBSTETRICS AND GYNECOLOGY | Facility: CLINIC | Age: 36
End: 2021-04-16

## 2021-04-16 VITALS
SYSTOLIC BLOOD PRESSURE: 141 MMHG | DIASTOLIC BLOOD PRESSURE: 92 MMHG | WEIGHT: 293 LBS | BODY MASS INDEX: 41.02 KG/M2 | HEART RATE: 78 BPM | HEIGHT: 71 IN

## 2021-04-16 DIAGNOSIS — Z30.42 ENCOUNTER FOR MANAGEMENT AND INJECTION OF DEPO-PROVERA: Primary | ICD-10-CM

## 2021-04-16 PROCEDURE — 96372 THER/PROPH/DIAG INJ SC/IM: CPT | Performed by: OBSTETRICS & GYNECOLOGY

## 2021-04-16 RX ORDER — MEDROXYPROGESTERONE ACETATE 150 MG/ML
150 INJECTION, SUSPENSION INTRAMUSCULAR ONCE
Status: COMPLETED | OUTPATIENT
Start: 2021-04-16 | End: 2021-04-16

## 2021-04-16 RX ADMIN — MEDROXYPROGESTERONE ACETATE 150 MG: 150 INJECTION, SUSPENSION INTRAMUSCULAR at 11:14

## 2021-04-16 NOTE — PROGRESS NOTES
(Patient supplied)  Patient here for depo injection. Patient did not have a reaction to the injection or medication. Injection on the right deltoid.  [id number], 2025 aug 31, [address]-0

## 2021-06-16 ENCOUNTER — TELEPHONE (OUTPATIENT)
Dept: OBSTETRICS AND GYNECOLOGY | Facility: CLINIC | Age: 36
End: 2021-06-16

## 2021-06-16 NOTE — TELEPHONE ENCOUNTER
Left vm to reschedule depo appt on 7/9. Dr. Adler is on call until the afternoon and hoping to reschedule appt in the afternoon.

## 2021-07-09 ENCOUNTER — CLINICAL SUPPORT (OUTPATIENT)
Dept: OBSTETRICS AND GYNECOLOGY | Facility: CLINIC | Age: 36
End: 2021-07-09

## 2021-07-09 VITALS
BODY MASS INDEX: 40.46 KG/M2 | DIASTOLIC BLOOD PRESSURE: 94 MMHG | HEIGHT: 71 IN | WEIGHT: 289 LBS | SYSTOLIC BLOOD PRESSURE: 120 MMHG

## 2021-07-09 DIAGNOSIS — Z30.42 ENCOUNTER FOR MANAGEMENT AND INJECTION OF DEPO-PROVERA: Primary | ICD-10-CM

## 2021-07-09 PROCEDURE — 96372 THER/PROPH/DIAG INJ SC/IM: CPT | Performed by: OBSTETRICS & GYNECOLOGY

## 2021-07-09 RX ORDER — MEDROXYPROGESTERONE ACETATE 150 MG/ML
150 INJECTION, SUSPENSION INTRAMUSCULAR ONCE
Status: COMPLETED | OUTPATIENT
Start: 2021-07-09 | End: 2021-07-09

## 2021-07-09 RX ADMIN — MEDROXYPROGESTERONE ACETATE 150 MG: 150 INJECTION, SUSPENSION INTRAMUSCULAR at 14:30

## 2021-07-09 NOTE — PROGRESS NOTES
Patient tolerated depo provera  150 mg right deltoid without a reaction  ndc [id number]  Lot [id number]  Exp 09/30/2024

## 2021-09-24 ENCOUNTER — CLINICAL SUPPORT (OUTPATIENT)
Dept: OBSTETRICS AND GYNECOLOGY | Facility: CLINIC | Age: 36
End: 2021-09-24

## 2021-09-24 DIAGNOSIS — Z30.42 ENCOUNTER FOR MANAGEMENT AND INJECTION OF DEPO-PROVERA: Primary | ICD-10-CM

## 2021-09-24 PROCEDURE — 96372 THER/PROPH/DIAG INJ SC/IM: CPT | Performed by: OBSTETRICS & GYNECOLOGY

## 2021-09-24 RX ORDER — MEDROXYPROGESTERONE ACETATE 150 MG/ML
150 INJECTION, SUSPENSION INTRAMUSCULAR ONCE
Status: COMPLETED | OUTPATIENT
Start: 2021-09-24 | End: 2021-09-24

## 2021-09-24 RX ADMIN — MEDROXYPROGESTERONE ACETATE 150 MG: 150 INJECTION, SUSPENSION INTRAMUSCULAR at 10:25

## 2021-09-24 NOTE — PROGRESS NOTES
Pt supplied depo provera 150 mg  IM right deltoid  Tolerated well with  No reaction  NDC: [address]-6  LOT: [id number]  EXP: 04/30/2025

## 2021-12-14 RX ORDER — MEDROXYPROGESTERONE ACETATE 150 MG/ML
150 INJECTION, SUSPENSION INTRAMUSCULAR
Qty: 1 ML | Refills: 0 | Status: SHIPPED | OUTPATIENT
Start: 2021-12-14 | End: 2021-12-17 | Stop reason: SDUPTHER

## 2021-12-17 ENCOUNTER — CLINICAL SUPPORT (OUTPATIENT)
Dept: OBSTETRICS AND GYNECOLOGY | Facility: CLINIC | Age: 36
End: 2021-12-17

## 2021-12-17 DIAGNOSIS — Z30.42 ENCOUNTER FOR MANAGEMENT AND INJECTION OF DEPO-PROVERA: Primary | ICD-10-CM

## 2021-12-17 PROCEDURE — 96372 THER/PROPH/DIAG INJ SC/IM: CPT | Performed by: OBSTETRICS & GYNECOLOGY

## 2021-12-17 RX ORDER — MEDROXYPROGESTERONE ACETATE 150 MG/ML
150 INJECTION, SUSPENSION INTRAMUSCULAR ONCE
Status: COMPLETED | OUTPATIENT
Start: 2021-12-17 | End: 2021-12-17

## 2021-12-17 RX ADMIN — MEDROXYPROGESTERONE ACETATE 150 MG: 150 INJECTION, SUSPENSION INTRAMUSCULAR at 10:44

## 2021-12-17 NOTE — PROGRESS NOTES
Pt supplied depo provera 150 mg  IM right deltoid  Tolerated well, no reaction  NDC [address]-6  LOT: [id number]  EXP: 02/28/2025

## 2022-03-14 RX ORDER — MEDROXYPROGESTERONE ACETATE 150 MG/ML
INJECTION, SUSPENSION INTRAMUSCULAR
Qty: 1 ML | Refills: 0 | Status: SHIPPED | OUTPATIENT
Start: 2022-03-14 | End: 2022-03-15 | Stop reason: SDUPTHER

## 2022-03-14 NOTE — TELEPHONE ENCOUNTER
Pt called to request refill on depo.  At her last visit we rescheduled her annual but gave depo anyway.    Please advise if ok to refill depo, or wait until after annual exam.  Scheduled for annual & depo on 3/15/2022.      Pt # [phone]

## 2022-03-15 ENCOUNTER — OFFICE VISIT (OUTPATIENT)
Dept: OBSTETRICS AND GYNECOLOGY | Facility: CLINIC | Age: 37
End: 2022-03-15

## 2022-03-15 VITALS
HEIGHT: 71 IN | WEIGHT: 293 LBS | HEART RATE: 58 BPM | DIASTOLIC BLOOD PRESSURE: 82 MMHG | BODY MASS INDEX: 41.02 KG/M2 | SYSTOLIC BLOOD PRESSURE: 125 MMHG

## 2022-03-15 DIAGNOSIS — Z01.419 WELL WOMAN EXAM WITH ROUTINE GYNECOLOGICAL EXAM: Primary | ICD-10-CM

## 2022-03-15 DIAGNOSIS — Z30.42 ENCOUNTER FOR MANAGEMENT AND INJECTION OF DEPO-PROVERA: ICD-10-CM

## 2022-03-15 PROBLEM — E66.812 OBESITY, CLASS II, BMI 35-39.9: Status: ACTIVE | Noted: 2021-07-29

## 2022-03-15 PROBLEM — Z98.890 S/P LATERAL MENISCECTOMY OF RIGHT KNEE: Status: ACTIVE | Noted: 2021-10-11

## 2022-03-15 PROBLEM — M17.31 POST-TRAUMATIC OSTEOARTHRITIS OF RIGHT KNEE: Status: ACTIVE | Noted: 2022-02-15

## 2022-03-15 PROBLEM — S83.281A ACUTE LATERAL MENISCAL TEAR, RIGHT, INITIAL ENCOUNTER: Status: ACTIVE | Noted: 2022-03-15

## 2022-03-15 PROBLEM — S83.271A COMPLEX TEAR OF LAT MENSC, CURRENT INJURY, RIGHT KNEE, INIT: Status: ACTIVE | Noted: 2021-07-29

## 2022-03-15 PROBLEM — E66.9 OBESITY, CLASS II, BMI 35-39.9: Status: ACTIVE | Noted: 2021-07-29

## 2022-03-15 PROBLEM — M25.561 RIGHT KNEE PAIN: Status: ACTIVE | Noted: 2021-08-30

## 2022-03-15 PROCEDURE — 99395 PREV VISIT EST AGE 18-39: CPT | Performed by: OBSTETRICS & GYNECOLOGY

## 2022-03-15 PROCEDURE — 2014F MENTAL STATUS ASSESS: CPT | Performed by: OBSTETRICS & GYNECOLOGY

## 2022-03-15 PROCEDURE — 96372 THER/PROPH/DIAG INJ SC/IM: CPT | Performed by: OBSTETRICS & GYNECOLOGY

## 2022-03-15 RX ORDER — MEDROXYPROGESTERONE ACETATE 150 MG/ML
1 INJECTION, SUSPENSION INTRAMUSCULAR
Qty: 1 ML | Refills: 4 | Status: SHIPPED | OUTPATIENT
Start: 2022-03-15

## 2022-03-15 RX ORDER — MEDROXYPROGESTERONE ACETATE 150 MG/ML
150 INJECTION, SUSPENSION INTRAMUSCULAR ONCE
Status: COMPLETED | OUTPATIENT
Start: 2022-03-15 | End: 2022-03-15

## 2022-03-15 RX ADMIN — MEDROXYPROGESTERONE ACETATE 150 MG: 150 INJECTION, SUSPENSION INTRAMUSCULAR at 10:33

## 2022-03-15 NOTE — PROGRESS NOTES
Chief Complaint   Patient presents with   • Gynecologic Exam     Here for Annual exam and patient suppliedDepo Provera inj. [id number] exp 2025 ndc 14426-417-03. Pt did not have a reaction.        Lorena Vitale is a 36 y.o.  who presents for an annual examination   Reports tried wellbutrin once daily, woke up with craving to smoke at night and had a couple bad dreams so she stopped    Pap history:  Last pap: 2018: NILM, HPV negative  Prior abnormal paps: yes, 7 years ago - treated with colposcopy  STDs  Sexually active: yes  History of STDs: no  Contraception:  Depo, no menses    Screening for BRCA-   Is patient's family history significant for BRCA risk factors? no    Past Medical History:   Diagnosis Date   • Abnormal Pap smear of cervix     HPV   • HPV (human papilloma virus) infection      Past Surgical History:   Procedure Laterality Date   • LAPAROSCOPIC TUBAL LIGATION     • TUBAL ABDOMINAL LIGATION       OB History    Para Term  AB Living   2 2 2     2   SAB IAB Ectopic Molar Multiple Live Births                    # Outcome Date GA Lbr Jerad/2nd Weight Sex Delivery Anes PTL Lv   2 Term      Vag-Spont      1 Term      Vag-Spont         Social History     Tobacco Use   • Smoking status: Current Every Day Smoker     Packs/day: 1.50     Types: Cigarettes   • Smokeless tobacco: Never Used   Substance Use Topics   • Alcohol use: No   • Drug use: No     Family History   Problem Relation Age of Onset   • Diabetes Mother    • Breast cancer Neg Hx    • Ovarian cancer Neg Hx    • Uterine cancer Neg Hx    • Colon cancer Neg Hx    • Deep vein thrombosis Neg Hx    • Pulmonary embolism Neg Hx      Current Outpatient Medications on File Prior to Visit   Medication Sig Dispense Refill   • Cetirizine HCl (WAL-ZYR PO)      • [DISCONTINUED] buPROPion SR (Wellbutrin SR) 150 MG 12 hr tablet Take 1 tablet by mouth Daily for 3 days, THEN 1 tablet 2 (two) times a day for 90 days. 60 tablet 2   •  "[DISCONTINUED] medroxyPROGESTERone Acetate 150 MG/ML suspension prefilled syringe ADMINISTER 1 ML IN THE MUSCLE EVERY 3 MONTHS AS DIRECTED BY PRESCRIBER 1 mL 0     No current facility-administered medications on file prior to visit.     No Known Allergies     Review of Systems   Constitutional: Negative for chills, fever and unexpected weight change.   HENT: Negative for congestion, nosebleeds and sore throat.    Eyes: Negative for visual disturbance.   Respiratory: Negative for cough, chest tightness and shortness of breath.    Cardiovascular: Negative for chest pain and palpitations.   Gastrointestinal: Negative for abdominal pain, constipation, diarrhea, nausea and vomiting.   Endocrine: Negative for polyuria.   Genitourinary: Negative for difficulty urinating, dyspareunia, dysuria, frequency, genital sores, hematuria, menstrual problem, pelvic pain, urgency, vaginal bleeding, vaginal discharge and vaginal pain.   Musculoskeletal: Negative for arthralgias and joint swelling.   Skin: Negative for color change and rash.   Neurological: Negative for dizziness, light-headedness and headaches.   Hematological: Does not bruise/bleed easily.   Psychiatric/Behavioral: Negative for dysphoric mood. The patient is not nervous/anxious.          OBJECTIVE:   Vitals:    03/15/22 1029   BP: 125/82   Pulse: 58   Weight: 133 kg (293 lb)   Height: 180.3 cm (71\")      Physical Exam   Constitutional: She is oriented to person, place, and time. She appears well-developed and well-nourished. No distress.   HENT:   Head: Normocephalic and atraumatic.   Eyes: No scleral icterus.   Neck: No thyromegaly present.   Cardiovascular: Normal rate and regular rhythm. Exam reveals no gallop and no friction rub.   No murmur heard.  Pulmonary/Chest: Effort normal and breath sounds normal. No respiratory distress. She has no wheezes. She has no rales. She exhibits no tenderness. Right breast exhibits no inverted nipple. Left breast exhibits no " inverted nipple. No breast swelling, tenderness, discharge or bleeding. Breasts are symmetrical.   Abdominal: Soft. Bowel sounds are normal. She exhibits no distension. There is no abdominal tenderness. There is no guarding.   Genitourinary: Vagina normal and uterus normal. There is no rash, tenderness, lesion, injury or Bartholin's cyst on the right labia. There is no rash, tenderness, lesion, injury or Bartholin's cyst on the left labia. Uterus is not mobile.   Cervix is not parous. Cervix does not exhibit motion tenderness, discharge, friability, lesion, polyp, nabothian cyst, eversion, pinkness or cyanosis. Right adnexum displays no mass, no tenderness and no fullness. Right adnexum is present.Left adnexum displays no mass, no tenderness and no fullness. Left adnexum is present.No vaginal discharge found.   Musculoskeletal: Normal range of motion. No deformity.   Neurological: She is alert and oriented to person, place, and time.   Skin: Skin is warm and dry. No rash noted. She is not diaphoretic. No erythema.   Psychiatric: Her speech is normal and behavior is normal. Judgment and thought content normal.       ASSESSMENT/PLAN:     Well woman counseling/screening:    Cervical cancer screening:    Remote (7y ago) h/o cervical dysplasia in past   The patient is due for a pap in 2023.    Screening guidelines discussed with patient  Breast cancer screening:    Clinical breast exam recommended for age 20-39 years every 1-3 years   Mammogram recommended starting age 40   Breast self awareness encouraged  STD Screening   Testing declined.    Contraception :   Desires to continue DMPA.  Counseled on risks/benefits/alternatives; reviewed recommendation to discontinue DMPA and switch to alternative progestin only method prior to reaching menopause to recover bone density.   Family history    does not demonstrate need for genetics referral   Healthy lifestyle counseling:   return for routine annual  checkups                 Return in about 12 weeks (around 6/7/2022). for DMPA, 1 year for annual

## 2022-08-11 ENCOUNTER — TELEPHONE (OUTPATIENT)
Dept: OBSTETRICS AND GYNECOLOGY | Facility: CLINIC | Age: 37
End: 2022-08-11

## 2022-08-11 NOTE — TELEPHONE ENCOUNTER
Provider: GLORIA SHAHID   Caller: KAHLIL NICHOLSON  Relationship to Patient:  SELF  [place] [id number]  [facility]  Phone Number: [phone]  Reason for Call: PT IS EXPERIENCING MENSTRUAL BLEEDING, PT IS ON DEPO BUT HAS NOT HAD A INJECTION SINCE MARCH, 10 YEARS AND PT HAS NOT HAD A PERIOD  When was the patient last seen: 3/2022  When did it start: TODAY  Characteristics of symptom/severity: HEAVY BLEEDING    NOTE: PT IS CRAMPING,BACK PAIN, INFORMED PT TO GO TO EMERGENCY ROOM IF IT CONTINUES.

## 2022-08-12 ENCOUNTER — CLINICAL SUPPORT (OUTPATIENT)
Dept: OBSTETRICS AND GYNECOLOGY | Facility: CLINIC | Age: 37
End: 2022-08-12

## 2022-08-12 DIAGNOSIS — Z30.42 ENCOUNTER FOR MANAGEMENT AND INJECTION OF DEPO-PROVERA: Primary | ICD-10-CM

## 2022-08-12 PROCEDURE — 96372 THER/PROPH/DIAG INJ SC/IM: CPT | Performed by: OBSTETRICS & GYNECOLOGY

## 2022-08-12 RX ORDER — MEDROXYPROGESTERONE ACETATE 150 MG/ML
150 INJECTION, SUSPENSION INTRAMUSCULAR ONCE
Status: COMPLETED | OUTPATIENT
Start: 2022-08-12 | End: 2022-08-12

## 2022-08-12 RX ADMIN — MEDROXYPROGESTERONE ACETATE 150 MG: 150 INJECTION, SUSPENSION INTRAMUSCULAR at 09:25

## 2022-11-11 ENCOUNTER — CLINICAL SUPPORT (OUTPATIENT)
Dept: OBSTETRICS AND GYNECOLOGY | Facility: CLINIC | Age: 37
End: 2022-11-11

## 2022-11-11 VITALS — BODY MASS INDEX: 41.84 KG/M2 | WEIGHT: 293 LBS

## 2022-11-11 DIAGNOSIS — Z30.42 ENCOUNTER FOR MANAGEMENT AND INJECTION OF DEPO-PROVERA: Primary | ICD-10-CM

## 2022-11-11 PROCEDURE — 96372 THER/PROPH/DIAG INJ SC/IM: CPT | Performed by: OBSTETRICS & GYNECOLOGY

## 2022-11-11 RX ORDER — MEDROXYPROGESTERONE ACETATE 150 MG/ML
150 INJECTION, SUSPENSION INTRAMUSCULAR ONCE
Status: COMPLETED | OUTPATIENT
Start: 2022-11-11 | End: 2022-11-11

## 2022-11-11 RX ADMIN — MEDROXYPROGESTERONE ACETATE 150 MG: 150 INJECTION, SUSPENSION INTRAMUSCULAR at 11:22

## 2022-11-11 NOTE — PROGRESS NOTES
Pt here for pt supplied depo injection, tolerated without a reaction. Lt Deltoid.  NDC:09952-079-02  LOT:[id number]  EXP:11/30/2026

## 2023-02-03 ENCOUNTER — CLINICAL SUPPORT (OUTPATIENT)
Dept: OBSTETRICS AND GYNECOLOGY | Facility: CLINIC | Age: 38
End: 2023-02-03
Payer: COMMERCIAL

## 2023-02-03 VITALS — BODY MASS INDEX: 41.14 KG/M2 | WEIGHT: 293 LBS

## 2023-02-03 DIAGNOSIS — Z30.42 ENCOUNTER FOR MANAGEMENT AND INJECTION OF DEPO-PROVERA: Primary | ICD-10-CM

## 2023-02-03 PROCEDURE — 96372 THER/PROPH/DIAG INJ SC/IM: CPT | Performed by: NURSE PRACTITIONER

## 2023-02-03 RX ORDER — MEDROXYPROGESTERONE ACETATE 150 MG/ML
150 INJECTION, SUSPENSION INTRAMUSCULAR ONCE
Status: COMPLETED | OUTPATIENT
Start: 2023-02-03 | End: 2023-02-03

## 2023-02-03 RX ADMIN — MEDROXYPROGESTERONE ACETATE 150 MG: 150 INJECTION, SUSPENSION INTRAMUSCULAR at 14:43

## 2023-02-03 NOTE — PROGRESS NOTES
Pt here for patient supplied depo injection. Rt. Deltoid. Pt tolerated w/o a reaction.   NDC: 67832-993-80 Lot: [id number] Exp: 4/30/27

## 2023-04-27 ENCOUNTER — TELEPHONE (OUTPATIENT)
Dept: OBSTETRICS AND GYNECOLOGY | Facility: CLINIC | Age: 38
End: 2023-04-27

## 2023-04-27 RX ORDER — MEDROXYPROGESTERONE ACETATE 150 MG/ML
INJECTION, SUSPENSION INTRAMUSCULAR
Qty: 1 ML | Refills: 4 | Status: SHIPPED | OUTPATIENT
Start: 2023-04-27

## 2023-04-27 NOTE — TELEPHONE ENCOUNTER
Med refill. Vitor pt. AE 3/15/22. Needs refill for injection scheduled tomorrow, 4/28/23 at 10:20. [facility] pharmacy. Thank you.

## 2023-04-27 NOTE — TELEPHONE ENCOUNTER
PT STATING SHE WILL NEED HER DEPO REFILLED FOR HER APPT TOMORROW FOR HER INJECTION. PLEASE ADVISE PT.

## 2023-04-28 ENCOUNTER — CLINICAL SUPPORT (OUTPATIENT)
Dept: OBSTETRICS AND GYNECOLOGY | Facility: CLINIC | Age: 38
End: 2023-04-28
Payer: COMMERCIAL

## 2023-04-28 VITALS — BODY MASS INDEX: 41.65 KG/M2 | WEIGHT: 293 LBS

## 2023-04-28 DIAGNOSIS — Z30.42 ENCOUNTER FOR MANAGEMENT AND INJECTION OF DEPO-PROVERA: Primary | ICD-10-CM

## 2023-04-28 RX ORDER — MEDROXYPROGESTERONE ACETATE 150 MG/ML
150 INJECTION, SUSPENSION INTRAMUSCULAR ONCE
Status: COMPLETED | OUTPATIENT
Start: 2023-04-28 | End: 2023-04-28

## 2023-04-28 RX ADMIN — MEDROXYPROGESTERONE ACETATE 150 MG: 150 INJECTION, SUSPENSION INTRAMUSCULAR at 10:30

## 2023-04-28 NOTE — PROGRESS NOTES
Pt presents for pt supplied Depo injection. Given in Rt Deltoid. Pt tolerated injection without a reaction.   NDC: 36518-966-55 Lot: [id number] Exp: 9/30/26.

## 2023-07-26 ENCOUNTER — CLINICAL SUPPORT (OUTPATIENT)
Dept: OBSTETRICS AND GYNECOLOGY | Facility: CLINIC | Age: 38
End: 2023-07-26
Payer: COMMERCIAL

## 2023-07-26 VITALS
HEIGHT: 71 IN | WEIGHT: 293 LBS | HEART RATE: 79 BPM | BODY MASS INDEX: 41.02 KG/M2 | DIASTOLIC BLOOD PRESSURE: 103 MMHG | SYSTOLIC BLOOD PRESSURE: 150 MMHG

## 2023-07-26 DIAGNOSIS — Z30.42 ENCOUNTER FOR MANAGEMENT AND INJECTION OF DEPO-PROVERA: Primary | ICD-10-CM

## 2023-07-26 RX ORDER — MEDROXYPROGESTERONE ACETATE 150 MG/ML
150 INJECTION, SUSPENSION INTRAMUSCULAR ONCE
Status: COMPLETED | OUTPATIENT
Start: 2023-07-26 | End: 2023-07-26

## 2023-07-26 RX ADMIN — MEDROXYPROGESTERONE ACETATE 150 MG: 150 INJECTION, SUSPENSION INTRAMUSCULAR at 10:22

## 2023-10-31 ENCOUNTER — CLINICAL SUPPORT (OUTPATIENT)
Dept: OBSTETRICS AND GYNECOLOGY | Facility: CLINIC | Age: 38
End: 2023-10-31
Payer: COMMERCIAL

## 2023-10-31 VITALS
HEIGHT: 71 IN | SYSTOLIC BLOOD PRESSURE: 150 MMHG | DIASTOLIC BLOOD PRESSURE: 94 MMHG | WEIGHT: 293 LBS | BODY MASS INDEX: 41.02 KG/M2

## 2023-10-31 DIAGNOSIS — Z30.42 ENCOUNTER FOR DEPO-PROVERA CONTRACEPTION: Primary | ICD-10-CM

## 2023-10-31 RX ORDER — MEDROXYPROGESTERONE ACETATE 150 MG/ML
150 INJECTION, SUSPENSION INTRAMUSCULAR ONCE
Status: COMPLETED | OUTPATIENT
Start: 2023-10-31 | End: 2023-10-31

## 2023-10-31 RX ADMIN — MEDROXYPROGESTERONE ACETATE 150 MG: 150 INJECTION, SUSPENSION INTRAMUSCULAR at 09:16

## 2024-01-31 ENCOUNTER — CLINICAL SUPPORT (OUTPATIENT)
Dept: OBSTETRICS AND GYNECOLOGY | Facility: CLINIC | Age: 39
End: 2024-01-31
Payer: COMMERCIAL

## 2024-01-31 VITALS — BODY MASS INDEX: 43.09 KG/M2 | HEIGHT: 71 IN

## 2024-01-31 DIAGNOSIS — Z30.42 ENCOUNTER FOR MANAGEMENT AND INJECTION OF DEPO-PROVERA: Primary | ICD-10-CM

## 2024-01-31 RX ORDER — MEDROXYPROGESTERONE ACETATE 150 MG/ML
150 INJECTION, SUSPENSION INTRAMUSCULAR ONCE
Status: COMPLETED | OUTPATIENT
Start: 2024-01-31 | End: 2024-01-31

## 2024-01-31 RX ADMIN — MEDROXYPROGESTERONE ACETATE 150 MG: 150 INJECTION, SUSPENSION INTRAMUSCULAR at 08:54

## 2024-01-31 NOTE — PROGRESS NOTES
Pt here today for pt supplied depo injection, tolerated without  reaction. Rt Deltoid. Ok per Tiffany to give injection.   NDC:33895-195-69  LOT:[id number]  EXP:9/30/2027

## 2024-03-12 ENCOUNTER — OFFICE VISIT (OUTPATIENT)
Dept: OBSTETRICS AND GYNECOLOGY | Facility: CLINIC | Age: 39
End: 2024-03-12
Payer: COMMERCIAL

## 2024-03-12 VITALS
WEIGHT: 293 LBS | SYSTOLIC BLOOD PRESSURE: 151 MMHG | HEIGHT: 71 IN | HEART RATE: 54 BPM | DIASTOLIC BLOOD PRESSURE: 91 MMHG | BODY MASS INDEX: 41.02 KG/M2

## 2024-03-12 DIAGNOSIS — Z01.419 WELL WOMAN EXAM WITH ROUTINE GYNECOLOGICAL EXAM: Primary | ICD-10-CM

## 2024-03-12 DIAGNOSIS — R63.5 WEIGHT GAIN: ICD-10-CM

## 2024-03-12 PROCEDURE — 99395 PREV VISIT EST AGE 18-39: CPT | Performed by: OBSTETRICS & GYNECOLOGY

## 2024-03-12 PROCEDURE — 1160F RVW MEDS BY RX/DR IN RCRD: CPT | Performed by: OBSTETRICS & GYNECOLOGY

## 2024-03-12 PROCEDURE — 1159F MED LIST DOCD IN RCRD: CPT | Performed by: OBSTETRICS & GYNECOLOGY

## 2024-03-12 PROCEDURE — 2014F MENTAL STATUS ASSESS: CPT | Performed by: OBSTETRICS & GYNECOLOGY

## 2024-03-12 RX ORDER — MEDROXYPROGESTERONE ACETATE 150 MG/ML
1 INJECTION, SUSPENSION INTRAMUSCULAR
Qty: 1 ML | Refills: 4 | Status: SHIPPED | OUTPATIENT
Start: 2024-03-12

## 2024-03-12 NOTE — PROGRESS NOTES
Chief Complaint   Patient presents with    Annual Exam     Last ae: 3/15/2022  Last pap: 2018 NIL HPV-        Lorena Vitale is a 38 y.o.  who presents for an annual examination   She reports difficulty losing weight, swelling in her feet and legs that she saw her PCP for previously  Reports a family h/o thyroid disease in her mom    Pap history:  Last pap: 2018: NILM, HPV negative  Prior abnormal paps: yes, 7 years ago - treated with colposcopy  STDs  Sexually active: yes  History of STDs: no  Contraception:  DMPA, no menses    Screening for BRCA-   Is patient's family history significant for BRCA risk factors? no    Past Medical History:   Diagnosis Date    Abnormal Pap smear of cervix     HPV    HPV (human papilloma virus) infection      Past Surgical History:   Procedure Laterality Date    LAPAROSCOPIC TUBAL LIGATION      TUBAL ABDOMINAL LIGATION       OB History    Para Term  AB Living   2 2 2     2   SAB IAB Ectopic Molar Multiple Live Births                    # Outcome Date GA Lbr Jerad/2nd Weight Sex Type Anes PTL Lv   2 Term      Vag-Spont      1 Term      Vag-Spont         Social History     Tobacco Use    Smoking status: Every Day     Current packs/day: 1.50     Types: Cigarettes    Smokeless tobacco: Never   Vaping Use    Vaping status: Never Used   Substance Use Topics    Alcohol use: No    Drug use: No     Family History   Problem Relation Age of Onset    Diabetes Mother     Breast cancer Neg Hx     Ovarian cancer Neg Hx     Uterine cancer Neg Hx     Colon cancer Neg Hx     Deep vein thrombosis Neg Hx     Pulmonary embolism Neg Hx      Current Outpatient Medications on File Prior to Visit   Medication Sig Dispense Refill    Cetirizine HCl (WAL-ZYR PO)       medroxyPROGESTERone Acetate 150 MG/ML suspension prefilled syringe ADMINISTER 1 ML IN THE MUSCLE EVERY 3 MONTHS AS DIRECTED BY PRESCRIBER 1 mL 4     No current facility-administered medications on file prior to visit.  "    No Known Allergies     Review of Systems      OBJECTIVE:   Vitals:    03/12/24 0957   BP: 151/91   Pulse: 54   Weight: (!) 138 kg (304 lb 3.2 oz)   Height: 180.3 cm (70.98\")      Physical Exam   Constitutional: She is oriented to person, place, and time. She appears well-developed and well-nourished. No distress.   HENT:   Head: Normocephalic and atraumatic.   Eyes: No scleral icterus.   Neck: No thyromegaly present.   Cardiovascular: Normal rate and regular rhythm. Exam reveals no gallop and no friction rub.   No murmur heard.  Pulmonary/Chest: Effort normal and breath sounds normal. No respiratory distress. She has no wheezes. She has no rales. She exhibits no tenderness. Right breast exhibits no inverted nipple. Left breast exhibits no inverted nipple. No breast swelling, tenderness, discharge or bleeding. Breasts are symmetrical.   Abdominal: Soft. Bowel sounds are normal. She exhibits no distension. There is no abdominal tenderness. There is no guarding.   Genitourinary: Vagina normal and uterus normal. There is no rash, tenderness, lesion, injury or Bartholin's cyst on the right labia. There is no rash, tenderness, lesion, injury or Bartholin's cyst on the left labia. Uterus is not mobile.   Cervix is not parous. Cervix does not exhibit motion tenderness, discharge, friability, lesion, polyp, nabothian cyst, eversion, pinkness or cyanosis. Right adnexum displays no mass, no tenderness and no fullness. Right adnexum is present.Left adnexum displays no mass, no tenderness and no fullness. Left adnexum is present.No vaginal discharge found.   Musculoskeletal: Normal range of motion. No deformity.   Neurological: She is alert and oriented to person, place, and time.   Skin: Skin is warm and dry. No rash noted. She is not diaphoretic. No erythema.   Psychiatric: Her speech is normal and behavior is normal. Judgment and thought content normal.       ASSESSMENT/PLAN:     Well woman " counseling/screening:    Cervical cancer screening:    Remote (7y ago) h/o cervical dysplasia in past   The patient is due for a pap today.    Screening guidelines discussed with patient  Breast cancer screening:    Clinical breast exam recommended for age 20-39 years every 1-3 years   Mammogram recommended starting age 40   Breast self awareness encouraged  STD Screening   Testing declined.    Contraception :   Desires to continue DMPA.  Counseled on risks/benefits/alternatives; reviewed recommendation to discontinue DMPA and switch to alternative progestin only method prior to reaching menopause to recover bone density. We reviewed PoP and Mirena IUD  Family history    does not demonstrate need for genetics referral   Healthy lifestyle counseling:   return for routine annual checkups  Elevated BP - reviewed with patient. Will get labs today. Strongly encouraged follow up with PCP as it was also elevated the last time she saw them in 2021. She agrees to call       Diagnoses and all orders for this visit:    1. Well woman exam with routine gynecological exam (Primary)  -     IGP, Apt HPV,rfx 16 / 18,45  -     Comprehensive Metabolic Panel  -     CBC (No Diff)  -     TSH Rfx On Abnormal To Free T4    2. Weight gain  -     Comprehensive Metabolic Panel  -     CBC (No Diff)  -     TSH Rfx On Abnormal To Free T4    Other orders  -     medroxyPROGESTERone Acetate 150 MG/ML suspension prefilled syringe; Inject 1 mL into the appropriate muscle as directed by prescriber Every 3 (Three) Months.  Dispense: 1 mL; Refill: 4        Return for DMPA injections, 1 year for annual.

## 2024-03-13 ENCOUNTER — TELEPHONE (OUTPATIENT)
Dept: OBSTETRICS AND GYNECOLOGY | Facility: CLINIC | Age: 39
End: 2024-03-13
Payer: COMMERCIAL

## 2024-03-13 LAB
ALBUMIN SERPL-MCNC: 4.2 G/DL (ref 3.9–4.9)
ALBUMIN/GLOB SERPL: 1.6 {RATIO} (ref 1.2–2.2)
ALP SERPL-CCNC: 75 IU/L (ref 44–121)
ALT SERPL-CCNC: 16 IU/L (ref 0–32)
AST SERPL-CCNC: 13 IU/L (ref 0–40)
BILIRUB SERPL-MCNC: <0.2 MG/DL (ref 0–1.2)
BUN SERPL-MCNC: 11 MG/DL (ref 6–20)
BUN/CREAT SERPL: 13 (ref 9–23)
CALCIUM SERPL-MCNC: 9.7 MG/DL (ref 8.7–10.2)
CHLORIDE SERPL-SCNC: 110 MMOL/L (ref 96–106)
CO2 SERPL-SCNC: 19 MMOL/L (ref 20–29)
CREAT SERPL-MCNC: 0.88 MG/DL (ref 0.57–1)
EGFRCR SERPLBLD CKD-EPI 2021: 87 ML/MIN/1.73
ERYTHROCYTE [DISTWIDTH] IN BLOOD BY AUTOMATED COUNT: 12.2 % (ref 11.7–15.4)
GLOBULIN SER CALC-MCNC: 2.6 G/DL (ref 1.5–4.5)
GLUCOSE SERPL-MCNC: 85 MG/DL (ref 70–99)
HCT VFR BLD AUTO: 45.2 % (ref 34–46.6)
HGB BLD-MCNC: 15 G/DL (ref 11.1–15.9)
MCH RBC QN AUTO: 31 PG (ref 26.6–33)
MCHC RBC AUTO-ENTMCNC: 33.2 G/DL (ref 31.5–35.7)
MCV RBC AUTO: 93 FL (ref 79–97)
PLATELET # BLD AUTO: 276 X10E3/UL (ref 150–450)
POTASSIUM SERPL-SCNC: 4.3 MMOL/L (ref 3.5–5.2)
PROT SERPL-MCNC: 6.8 G/DL (ref 6–8.5)
RBC # BLD AUTO: 4.84 X10E6/UL (ref 3.77–5.28)
SODIUM SERPL-SCNC: 143 MMOL/L (ref 134–144)
TSH SERPL DL<=0.005 MIU/L-ACNC: 2.07 UIU/ML (ref 0.45–4.5)
WBC # BLD AUTO: 7.7 X10E3/UL (ref 3.4–10.8)

## 2024-03-13 NOTE — TELEPHONE ENCOUNTER
----- Message from Crys Adler MD sent at 3/13/2024 10:31 AM EDT -----  Please let patient know that her blood work was mostly within normal limits.  She had two values (chloride and CO2) that were just mildly out of range but these are unlikely to be clinically significant as they were only within a couple points of being normal.

## 2024-03-14 LAB
CYTOLOGIST CVX/VAG CYTO: NORMAL
CYTOLOGY CVX/VAG DOC CYTO: NORMAL
CYTOLOGY CVX/VAG DOC THIN PREP: NORMAL
DX ICD CODE: NORMAL
HPV I/H RISK 4 DNA CVX QL PROBE+SIG AMP: NEGATIVE
Lab: NORMAL
OTHER STN SPEC: NORMAL
STAT OF ADQ CVX/VAG CYTO-IMP: NORMAL

## 2024-04-19 ENCOUNTER — CLINICAL SUPPORT (OUTPATIENT)
Dept: OBSTETRICS AND GYNECOLOGY | Facility: CLINIC | Age: 39
End: 2024-04-19
Payer: COMMERCIAL

## 2024-04-19 VITALS
WEIGHT: 293 LBS | BODY MASS INDEX: 41.02 KG/M2 | HEART RATE: 59 BPM | DIASTOLIC BLOOD PRESSURE: 89 MMHG | SYSTOLIC BLOOD PRESSURE: 135 MMHG | HEIGHT: 71 IN

## 2024-04-19 DIAGNOSIS — Z30.42 ENCOUNTER FOR DEPO-PROVERA CONTRACEPTION: Primary | ICD-10-CM

## 2024-04-19 RX ORDER — MEDROXYPROGESTERONE ACETATE 150 MG/ML
150 INJECTION, SUSPENSION INTRAMUSCULAR ONCE
Status: COMPLETED | OUTPATIENT
Start: 2024-04-19 | End: 2024-04-19

## 2024-04-19 RX ADMIN — MEDROXYPROGESTERONE ACETATE 150 MG: 150 INJECTION, SUSPENSION INTRAMUSCULAR at 08:55

## 2024-04-19 NOTE — PROGRESS NOTES
Patient is here for depo shot and supplied that depo shot. Patient did not have a reaction at this time with the medication

## 2024-07-12 ENCOUNTER — CLINICAL SUPPORT (OUTPATIENT)
Dept: OBSTETRICS AND GYNECOLOGY | Facility: CLINIC | Age: 39
End: 2024-07-12
Payer: COMMERCIAL

## 2024-07-12 VITALS — WEIGHT: 293 LBS | HEIGHT: 71 IN | BODY MASS INDEX: 41.02 KG/M2

## 2024-07-12 DIAGNOSIS — Z30.42 ENCOUNTER FOR MANAGEMENT AND INJECTION OF DEPO-PROVERA: Primary | ICD-10-CM

## 2024-07-12 RX ORDER — MEDROXYPROGESTERONE ACETATE 150 MG/ML
150 INJECTION, SUSPENSION INTRAMUSCULAR ONCE
Status: COMPLETED | OUTPATIENT
Start: 2024-07-12 | End: 2024-07-12

## 2024-07-12 RX ADMIN — MEDROXYPROGESTERONE ACETATE 150 MG: 150 INJECTION, SUSPENSION INTRAMUSCULAR at 14:11

## 2024-07-12 NOTE — PROGRESS NOTES
Pt here today for pt supplied depo injection, tolerated without a reaction. Rt Deltoid  NDC:57241-969-75  LOT:[id number]  EXP:10/31/2028

## 2024-10-01 ENCOUNTER — CLINICAL SUPPORT (OUTPATIENT)
Dept: OBSTETRICS AND GYNECOLOGY | Facility: CLINIC | Age: 39
End: 2024-10-01
Payer: COMMERCIAL

## 2024-10-01 VITALS — SYSTOLIC BLOOD PRESSURE: 133 MMHG | WEIGHT: 293 LBS | BODY MASS INDEX: 43.26 KG/M2 | DIASTOLIC BLOOD PRESSURE: 85 MMHG

## 2024-10-01 DIAGNOSIS — Z30.42 ENCOUNTER FOR MANAGEMENT AND INJECTION OF DEPO-PROVERA: Primary | ICD-10-CM

## 2024-10-01 RX ORDER — MEDROXYPROGESTERONE ACETATE 150 MG/ML
150 INJECTION, SUSPENSION INTRAMUSCULAR ONCE
Status: COMPLETED | OUTPATIENT
Start: 2024-10-01 | End: 2024-10-01

## 2024-10-01 RX ORDER — OMEGA-3 FATTY ACIDS/FISH OIL 300-1000MG
CAPSULE ORAL AS NEEDED
COMMUNITY

## 2024-10-01 RX ORDER — CHOLECALCIFEROL (VITAMIN D3) 25 MCG
1000 TABLET ORAL DAILY
COMMUNITY
Start: 2024-09-23 | End: 2025-09-23

## 2024-10-01 RX ORDER — LISINOPRIL 10 MG/1
10 TABLET ORAL DAILY
COMMUNITY
Start: 2024-09-20

## 2024-10-01 RX ADMIN — MEDROXYPROGESTERONE ACETATE 150 MG: 150 INJECTION, SUSPENSION INTRAMUSCULAR at 09:38

## 2024-10-01 NOTE — PROGRESS NOTES
Pt here for Depo-Provera, 150 mg, patient supplied.    NDC: 73083-698-64  Lot #: [id number]  Exp.: 10/31/2028    Pt received the injection in the right deltoid, IM, and tolerated well.

## 2024-12-27 ENCOUNTER — CLINICAL SUPPORT (OUTPATIENT)
Dept: OBSTETRICS AND GYNECOLOGY | Facility: CLINIC | Age: 39
End: 2024-12-27
Payer: COMMERCIAL

## 2024-12-27 VITALS
HEIGHT: 71 IN | BODY MASS INDEX: 41.02 KG/M2 | DIASTOLIC BLOOD PRESSURE: 82 MMHG | WEIGHT: 293 LBS | SYSTOLIC BLOOD PRESSURE: 122 MMHG | HEART RATE: 77 BPM

## 2024-12-27 DIAGNOSIS — Z30.42 ENCOUNTER FOR MANAGEMENT AND INJECTION OF DEPO-PROVERA: Primary | ICD-10-CM

## 2024-12-27 RX ORDER — MEDROXYPROGESTERONE ACETATE 150 MG/ML
150 INJECTION, SUSPENSION INTRAMUSCULAR ONCE
Status: COMPLETED | OUTPATIENT
Start: 2024-12-27 | End: 2024-12-27

## 2024-12-27 RX ADMIN — MEDROXYPROGESTERONE ACETATE 150 MG: 150 INJECTION, SUSPENSION INTRAMUSCULAR at 11:59

## 2024-12-27 NOTE — PROGRESS NOTES
Pt is here today for depo provera injec. 150 mg IM R D. Pt tolerated well no reaction    [id number]  LOT-[id number]  EXP-02/28/2029

## 2025-03-24 RX ORDER — MEDROXYPROGESTERONE ACETATE 150 MG/ML
INJECTION, SUSPENSION INTRAMUSCULAR
Qty: 1 ML | Refills: 0 | Status: SHIPPED | OUTPATIENT
Start: 2025-03-24

## 2025-03-25 ENCOUNTER — CLINICAL SUPPORT (OUTPATIENT)
Dept: OBSTETRICS AND GYNECOLOGY | Facility: CLINIC | Age: 40
End: 2025-03-25
Payer: COMMERCIAL

## 2025-03-25 VITALS
BODY MASS INDEX: 41.02 KG/M2 | DIASTOLIC BLOOD PRESSURE: 68 MMHG | HEIGHT: 71 IN | WEIGHT: 293 LBS | SYSTOLIC BLOOD PRESSURE: 102 MMHG

## 2025-03-25 DIAGNOSIS — Z30.42 ENCOUNTER FOR MANAGEMENT AND INJECTION OF DEPO-PROVERA: Primary | ICD-10-CM

## 2025-03-25 PROCEDURE — 96372 THER/PROPH/DIAG INJ SC/IM: CPT | Performed by: OBSTETRICS & GYNECOLOGY

## 2025-03-25 RX ORDER — POLYETHYLENE GLYCOL 3350 17 G/17G
17 POWDER, FOR SOLUTION ORAL
COMMUNITY
Start: 2025-03-24

## 2025-03-25 RX ORDER — MEDROXYPROGESTERONE ACETATE 150 MG/ML
150 INJECTION, SUSPENSION INTRAMUSCULAR ONCE
Status: COMPLETED | OUTPATIENT
Start: 2025-03-25 | End: 2025-03-25

## 2025-03-25 RX ADMIN — MEDROXYPROGESTERONE ACETATE 150 MG: 150 INJECTION, SUSPENSION INTRAMUSCULAR at 09:56

## 2025-03-25 NOTE — PROGRESS NOTES
Patient is here for Depo Provera Injection, given in right arm, patient supplied, no reaction. Lot # [id number], exp. Date 05/2026, ndc [id number]. RTO 12 weeks.

## 2025-06-16 ENCOUNTER — TELEPHONE (OUTPATIENT)
Dept: OBSTETRICS AND GYNECOLOGY | Facility: CLINIC | Age: 40
End: 2025-06-16
Payer: COMMERCIAL

## 2025-06-16 RX ORDER — MEDROXYPROGESTERONE ACETATE 150 MG/ML
INJECTION, SUSPENSION INTRAMUSCULAR
Qty: 1 ML | Refills: 0 | Status: SHIPPED | OUTPATIENT
Start: 2025-06-16

## 2025-06-16 NOTE — TELEPHONE ENCOUNTER
Caller: Lorena Vitale    Relationship: Self    Best call back number: [phone]    Requested Prescriptions:   Requested Prescriptions      No prescriptions requested or ordered in this encounter    DEPO    Pharmacy where request should be sent: [id number] - [place], [place] - [address]  AT [facility] [address] - [phone]  - [phone] FX     Last office visit with prescribing clinician: 3/12/2024   prescribing clinician: 8/19/2025     Additional details provided by patient: PATIENT IS SCHEDULED TOMORROW 6-17 AT 11 AM FOR DEPO SHOT BUT HAS NO MORE REFILLS     Does the patient have less than a 3 day supply:  [x] Yes  [] No    Would you like a call back once the refill request has been completed: [x] Yes [] No      Francisco Wallace Rep   06/16/25 10:42 EDT

## 2025-06-17 ENCOUNTER — CLINICAL SUPPORT (OUTPATIENT)
Dept: OBSTETRICS AND GYNECOLOGY | Facility: CLINIC | Age: 40
End: 2025-06-17
Payer: COMMERCIAL

## 2025-06-17 VITALS — DIASTOLIC BLOOD PRESSURE: 85 MMHG | WEIGHT: 293 LBS | SYSTOLIC BLOOD PRESSURE: 133 MMHG | BODY MASS INDEX: 42.42 KG/M2

## 2025-06-17 DIAGNOSIS — Z30.42 ENCOUNTER FOR MANAGEMENT AND INJECTION OF DEPO-PROVERA: Primary | ICD-10-CM

## 2025-06-17 RX ORDER — MEDROXYPROGESTERONE ACETATE 150 MG/ML
150 INJECTION, SUSPENSION INTRAMUSCULAR ONCE
Status: COMPLETED | OUTPATIENT
Start: 2025-06-17 | End: 2025-06-17

## 2025-06-17 RX ADMIN — MEDROXYPROGESTERONE ACETATE 150 MG: 150 INJECTION, SUSPENSION INTRAMUSCULAR at 11:10

## 2025-06-17 NOTE — PROGRESS NOTES
Pt here today for pt supplied depo injection, tolerated without a reaction. Rt Deltoid  NDC: [address]-0  LOT: [id number]  EXP: 05/30/2026